# Patient Record
Sex: MALE | Race: WHITE | NOT HISPANIC OR LATINO | Employment: OTHER | URBAN - METROPOLITAN AREA
[De-identification: names, ages, dates, MRNs, and addresses within clinical notes are randomized per-mention and may not be internally consistent; named-entity substitution may affect disease eponyms.]

---

## 2017-01-25 ENCOUNTER — RECORDS - HEALTHEAST (OUTPATIENT)
Dept: LAB | Facility: CLINIC | Age: 77
End: 2017-01-25

## 2017-01-25 LAB
CHOLEST SERPL-MCNC: 157 MG/DL
FASTING STATUS PATIENT QL REPORTED: ABNORMAL
HDLC SERPL-MCNC: 50 MG/DL
LDLC SERPL CALC-MCNC: 77 MG/DL
TRIGL SERPL-MCNC: 152 MG/DL

## 2017-08-07 ENCOUNTER — RECORDS - HEALTHEAST (OUTPATIENT)
Dept: LAB | Facility: CLINIC | Age: 77
End: 2017-08-07

## 2017-08-07 LAB
CHOLEST SERPL-MCNC: 150 MG/DL
FASTING STATUS PATIENT QL REPORTED: YES
HDLC SERPL-MCNC: 51 MG/DL
LDLC SERPL CALC-MCNC: 74 MG/DL
TRIGL SERPL-MCNC: 124 MG/DL

## 2018-02-20 ENCOUNTER — RECORDS - HEALTHEAST (OUTPATIENT)
Dept: LAB | Facility: CLINIC | Age: 78
End: 2018-02-20

## 2018-02-20 LAB
ALBUMIN SERPL-MCNC: 3.5 G/DL (ref 3.5–5)
ALP SERPL-CCNC: 76 U/L (ref 45–120)
ALT SERPL W P-5'-P-CCNC: <9 U/L (ref 0–45)
ANION GAP SERPL CALCULATED.3IONS-SCNC: 14 MMOL/L (ref 5–18)
AST SERPL W P-5'-P-CCNC: 17 U/L (ref 0–40)
BILIRUB SERPL-MCNC: 0.5 MG/DL (ref 0–1)
BUN SERPL-MCNC: 29 MG/DL (ref 8–28)
CALCIUM SERPL-MCNC: 9.3 MG/DL (ref 8.5–10.5)
CHLORIDE BLD-SCNC: 106 MMOL/L (ref 98–107)
CHOLEST SERPL-MCNC: 157 MG/DL
CO2 SERPL-SCNC: 19 MMOL/L (ref 22–31)
CREAT SERPL-MCNC: 1.1 MG/DL (ref 0.7–1.3)
FASTING STATUS PATIENT QL REPORTED: NORMAL
GFR SERPL CREATININE-BSD FRML MDRD: >60 ML/MIN/1.73M2
GLUCOSE BLD-MCNC: 108 MG/DL (ref 70–125)
HDLC SERPL-MCNC: 50 MG/DL
LDLC SERPL CALC-MCNC: 86 MG/DL
POTASSIUM BLD-SCNC: 4.9 MMOL/L (ref 3.5–5)
PROT SERPL-MCNC: 6.9 G/DL (ref 6–8)
SODIUM SERPL-SCNC: 139 MMOL/L (ref 136–145)
TRIGL SERPL-MCNC: 105 MG/DL

## 2019-01-29 ENCOUNTER — RECORDS - HEALTHEAST (OUTPATIENT)
Dept: LAB | Facility: CLINIC | Age: 79
End: 2019-01-29

## 2019-01-29 ENCOUNTER — RECORDS - HEALTHEAST (OUTPATIENT)
Dept: ADMINISTRATIVE | Facility: OTHER | Age: 79
End: 2019-01-29

## 2019-01-29 LAB
ALBUMIN SERPL-MCNC: 3.5 G/DL (ref 3.5–5)
ALP SERPL-CCNC: 87 U/L (ref 45–120)
ALT SERPL W P-5'-P-CCNC: <9 U/L (ref 0–45)
ANION GAP SERPL CALCULATED.3IONS-SCNC: 11 MMOL/L (ref 5–18)
AST SERPL W P-5'-P-CCNC: 13 U/L (ref 0–40)
BILIRUB SERPL-MCNC: 0.5 MG/DL (ref 0–1)
BUN SERPL-MCNC: 35 MG/DL (ref 8–28)
CALCIUM SERPL-MCNC: 9.3 MG/DL (ref 8.5–10.5)
CHLORIDE BLD-SCNC: 103 MMOL/L (ref 98–107)
CHOLEST SERPL-MCNC: 119 MG/DL
CO2 SERPL-SCNC: 24 MMOL/L (ref 22–31)
CREAT SERPL-MCNC: 1.38 MG/DL (ref 0.7–1.3)
FASTING STATUS PATIENT QL REPORTED: YES
GFR SERPL CREATININE-BSD FRML MDRD: 50 ML/MIN/1.73M2
GLUCOSE BLD-MCNC: 116 MG/DL (ref 70–125)
HDLC SERPL-MCNC: 52 MG/DL
LDLC SERPL CALC-MCNC: 48 MG/DL
POTASSIUM BLD-SCNC: 4.8 MMOL/L (ref 3.5–5)
PROT SERPL-MCNC: 6.4 G/DL (ref 6–8)
SODIUM SERPL-SCNC: 138 MMOL/L (ref 136–145)
TRIGL SERPL-MCNC: 96 MG/DL

## 2020-01-28 ENCOUNTER — RECORDS - HEALTHEAST (OUTPATIENT)
Dept: LAB | Facility: CLINIC | Age: 80
End: 2020-01-28

## 2020-01-28 LAB
ALBUMIN SERPL-MCNC: 3 G/DL (ref 3.5–5)
ALP SERPL-CCNC: 78 U/L (ref 45–120)
ALT SERPL W P-5'-P-CCNC: <9 U/L (ref 0–45)
ANION GAP SERPL CALCULATED.3IONS-SCNC: 16 MMOL/L (ref 5–18)
AST SERPL W P-5'-P-CCNC: 21 U/L (ref 0–40)
BILIRUB SERPL-MCNC: 0.3 MG/DL (ref 0–1)
BUN SERPL-MCNC: 31 MG/DL (ref 8–28)
CALCIUM SERPL-MCNC: 9.2 MG/DL (ref 8.5–10.5)
CHLORIDE BLD-SCNC: 106 MMOL/L (ref 98–107)
CHOLEST SERPL-MCNC: 117 MG/DL
CO2 SERPL-SCNC: 18 MMOL/L (ref 22–31)
CREAT SERPL-MCNC: 1.26 MG/DL (ref 0.7–1.3)
FASTING STATUS PATIENT QL REPORTED: YES
FERRITIN SERPL-MCNC: 3 NG/ML (ref 27–300)
GFR SERPL CREATININE-BSD FRML MDRD: 55 ML/MIN/1.73M2
GLUCOSE BLD-MCNC: 108 MG/DL (ref 70–125)
HDLC SERPL-MCNC: 43 MG/DL
IRON SATN MFR SERPL: 5 % (ref 20–50)
IRON SERPL-MCNC: 18 UG/DL (ref 42–175)
LDLC SERPL CALC-MCNC: 53 MG/DL
POTASSIUM BLD-SCNC: 5.4 MMOL/L (ref 3.5–5)
PROT SERPL-MCNC: 6.3 G/DL (ref 6–8)
SODIUM SERPL-SCNC: 140 MMOL/L (ref 136–145)
TIBC SERPL-MCNC: 340 UG/DL (ref 313–563)
TRANSFERRIN SERPL-MCNC: 272 MG/DL (ref 212–360)
TRIGL SERPL-MCNC: 107 MG/DL
TSH SERPL DL<=0.005 MIU/L-ACNC: 2.46 UIU/ML (ref 0.3–5)

## 2020-01-29 ENCOUNTER — ANESTHESIA - HEALTHEAST (OUTPATIENT)
Dept: SURGERY | Facility: HOSPITAL | Age: 80
End: 2020-01-29

## 2020-01-29 ENCOUNTER — RECORDS - HEALTHEAST (OUTPATIENT)
Dept: ADMINISTRATIVE | Facility: OTHER | Age: 80
End: 2020-01-29

## 2020-01-29 ASSESSMENT — MIFFLIN-ST. JEOR: SCORE: 1653.38

## 2020-01-30 ENCOUNTER — SURGERY - HEALTHEAST (OUTPATIENT)
Dept: SURGERY | Facility: HOSPITAL | Age: 80
End: 2020-01-30

## 2020-01-31 ASSESSMENT — MIFFLIN-ST. JEOR: SCORE: 1681.5

## 2020-02-01 ASSESSMENT — MIFFLIN-ST. JEOR: SCORE: 1681.5

## 2020-02-02 ASSESSMENT — MIFFLIN-ST. JEOR: SCORE: 1686.03

## 2020-02-03 ASSESSMENT — MIFFLIN-ST. JEOR: SCORE: 1688.76

## 2020-02-04 ASSESSMENT — MIFFLIN-ST. JEOR: SCORE: 1704.18

## 2020-02-05 ASSESSMENT — MIFFLIN-ST. JEOR: SCORE: 1698.28

## 2020-02-06 ASSESSMENT — MIFFLIN-ST. JEOR
SCORE: 1723.23
SCORE: 1706.45

## 2020-02-07 ASSESSMENT — MIFFLIN-ST. JEOR: SCORE: 1727.77

## 2020-02-08 ASSESSMENT — MIFFLIN-ST. JEOR: SCORE: 1710.53

## 2020-02-09 ENCOUNTER — HOME CARE/HOSPICE - HEALTHEAST (OUTPATIENT)
Dept: HOME HEALTH SERVICES | Facility: HOME HEALTH | Age: 80
End: 2020-02-09

## 2020-02-09 ASSESSMENT — MIFFLIN-ST. JEOR: SCORE: 1706.9

## 2020-02-10 ASSESSMENT — MIFFLIN-ST. JEOR
SCORE: 1666.53
SCORE: 1674.24

## 2020-02-11 ASSESSMENT — MIFFLIN-ST. JEOR
SCORE: 1657.46
SCORE: 1658.82

## 2020-02-12 ASSESSMENT — MIFFLIN-ST. JEOR: SCORE: 1704.18

## 2020-02-14 ENCOUNTER — COMMUNICATION - HEALTHEAST (OUTPATIENT)
Dept: ONCOLOGY | Facility: CLINIC | Age: 80
End: 2020-02-14

## 2020-02-19 ENCOUNTER — RECORDS - HEALTHEAST (OUTPATIENT)
Dept: ADMINISTRATIVE | Facility: OTHER | Age: 80
End: 2020-02-19

## 2020-03-03 ENCOUNTER — OFFICE VISIT - HEALTHEAST (OUTPATIENT)
Dept: SURGERY | Facility: CLINIC | Age: 80
End: 2020-03-03

## 2020-03-03 DIAGNOSIS — C18.0 PRIMARY MALIGNANT NEOPLASM OF CECUM (H): ICD-10-CM

## 2020-03-05 ENCOUNTER — RECORDS - HEALTHEAST (OUTPATIENT)
Dept: ADMINISTRATIVE | Facility: OTHER | Age: 80
End: 2020-03-05

## 2020-03-09 ENCOUNTER — OFFICE VISIT - HEALTHEAST (OUTPATIENT)
Dept: ONCOLOGY | Facility: CLINIC | Age: 80
End: 2020-03-09

## 2020-03-09 DIAGNOSIS — D50.0 IRON DEFICIENCY ANEMIA DUE TO CHRONIC BLOOD LOSS: ICD-10-CM

## 2020-03-09 DIAGNOSIS — C18.2 PRIMARY ADENOCARCINOMA OF ASCENDING COLON (H): ICD-10-CM

## 2020-03-09 LAB
ERYTHROCYTE [DISTWIDTH] IN BLOOD BY AUTOMATED COUNT: 22.7 % (ref 11–14.5)
FERRITIN SERPL-MCNC: 66 NG/ML (ref 27–300)
HCT VFR BLD AUTO: 32.5 % (ref 40–54)
HGB BLD-MCNC: 9.5 G/DL (ref 14–18)
MCH RBC QN AUTO: 22.4 PG (ref 27–34)
MCHC RBC AUTO-ENTMCNC: 29.2 G/DL (ref 32–36)
MCV RBC AUTO: 77 FL (ref 80–100)
PLATELET # BLD AUTO: 863 THOU/UL (ref 140–440)
PMV BLD AUTO: 9.2 FL (ref 8.5–12.5)
RBC # BLD AUTO: 4.24 MILL/UL (ref 4.4–6.2)
WBC: 19.6 THOU/UL (ref 4–11)

## 2020-03-09 ASSESSMENT — MIFFLIN-ST. JEOR: SCORE: 1569.46

## 2020-03-10 ENCOUNTER — COMMUNICATION - HEALTHEAST (OUTPATIENT)
Dept: ADMINISTRATIVE | Facility: HOSPITAL | Age: 80
End: 2020-03-10

## 2020-03-10 ENCOUNTER — COMMUNICATION - HEALTHEAST (OUTPATIENT)
Dept: ONCOLOGY | Facility: CLINIC | Age: 80
End: 2020-03-10

## 2020-03-17 ENCOUNTER — RECORDS - HEALTHEAST (OUTPATIENT)
Dept: LAB | Facility: CLINIC | Age: 80
End: 2020-03-17

## 2020-03-17 LAB
IRON SATN MFR SERPL: 9 % (ref 20–50)
IRON SERPL-MCNC: 27 UG/DL (ref 42–175)
IRON SERPL-MCNC: 27 UG/DL (ref 42–175)
TIBC SERPL-MCNC: 288 UG/DL (ref 313–563)
TRANSFERRIN SERPL-MCNC: 230 MG/DL (ref 212–360)

## 2020-03-31 ENCOUNTER — AMBULATORY - HEALTHEAST (OUTPATIENT)
Dept: ONCOLOGY | Facility: HOSPITAL | Age: 80
End: 2020-03-31

## 2020-04-01 ENCOUNTER — COMMUNICATION - HEALTHEAST (OUTPATIENT)
Dept: ADMINISTRATIVE | Facility: HOSPITAL | Age: 80
End: 2020-04-01

## 2020-04-01 ENCOUNTER — COMMUNICATION - HEALTHEAST (OUTPATIENT)
Dept: ONCOLOGY | Facility: HOSPITAL | Age: 80
End: 2020-04-01

## 2020-05-12 ENCOUNTER — COMMUNICATION - HEALTHEAST (OUTPATIENT)
Dept: ADMINISTRATIVE | Facility: HOSPITAL | Age: 80
End: 2020-05-12

## 2020-06-05 ENCOUNTER — COMMUNICATION - HEALTHEAST (OUTPATIENT)
Dept: ONCOLOGY | Facility: HOSPITAL | Age: 80
End: 2020-06-05

## 2020-06-05 ENCOUNTER — COMMUNICATION - HEALTHEAST (OUTPATIENT)
Dept: ADMINISTRATIVE | Facility: HOSPITAL | Age: 80
End: 2020-06-05

## 2020-06-10 ENCOUNTER — COMMUNICATION - HEALTHEAST (OUTPATIENT)
Dept: ONCOLOGY | Facility: HOSPITAL | Age: 80
End: 2020-06-10

## 2020-06-18 ENCOUNTER — COMMUNICATION - HEALTHEAST (OUTPATIENT)
Dept: ONCOLOGY | Facility: HOSPITAL | Age: 80
End: 2020-06-18

## 2020-10-01 ENCOUNTER — HOSPITAL ENCOUNTER (OUTPATIENT)
Dept: CT IMAGING | Facility: CLINIC | Age: 80
Setting detail: RADIATION/ONCOLOGY SERIES
Discharge: STILL ADMITTED OR ADMINISTRATIVE | End: 2020-10-01
Attending: INTERNAL MEDICINE

## 2020-10-01 DIAGNOSIS — C18.2 PRIMARY ADENOCARCINOMA OF ASCENDING COLON (H): ICD-10-CM

## 2020-10-01 DIAGNOSIS — D50.0 IRON DEFICIENCY ANEMIA DUE TO CHRONIC BLOOD LOSS: ICD-10-CM

## 2020-10-01 LAB
CREAT BLD-MCNC: 1.5 MG/DL (ref 0.7–1.3)
GFR SERPL CREATININE-BSD FRML MDRD: 45 ML/MIN/1.73M2

## 2020-10-05 ENCOUNTER — OFFICE VISIT - HEALTHEAST (OUTPATIENT)
Dept: ONCOLOGY | Facility: HOSPITAL | Age: 80
End: 2020-10-05

## 2020-10-05 ENCOUNTER — AMBULATORY - HEALTHEAST (OUTPATIENT)
Dept: ONCOLOGY | Facility: HOSPITAL | Age: 80
End: 2020-10-05

## 2020-10-05 ENCOUNTER — AMBULATORY - HEALTHEAST (OUTPATIENT)
Dept: INFUSION THERAPY | Facility: HOSPITAL | Age: 80
End: 2020-10-05

## 2020-10-05 DIAGNOSIS — D50.0 IRON DEFICIENCY ANEMIA DUE TO CHRONIC BLOOD LOSS: ICD-10-CM

## 2020-10-05 DIAGNOSIS — C18.2 PRIMARY ADENOCARCINOMA OF ASCENDING COLON (H): ICD-10-CM

## 2020-10-05 LAB
ALBUMIN SERPL-MCNC: 4.1 G/DL (ref 3.5–5)
ALP SERPL-CCNC: 97 U/L (ref 45–120)
ALT SERPL W P-5'-P-CCNC: 10 U/L (ref 0–45)
ANION GAP SERPL CALCULATED.3IONS-SCNC: 10 MMOL/L (ref 5–18)
AST SERPL W P-5'-P-CCNC: 21 U/L (ref 0–40)
BASOPHILS # BLD AUTO: 0.1 THOU/UL (ref 0–0.2)
BASOPHILS NFR BLD AUTO: 1 % (ref 0–2)
BILIRUB SERPL-MCNC: 1 MG/DL (ref 0–1)
BUN SERPL-MCNC: 21 MG/DL (ref 8–28)
CALCIUM SERPL-MCNC: 9.2 MG/DL (ref 8.5–10.5)
CEA SERPL-MCNC: 1.6 NG/ML (ref 0–3)
CHLORIDE BLD-SCNC: 107 MMOL/L (ref 98–107)
CO2 SERPL-SCNC: 23 MMOL/L (ref 22–31)
CREAT SERPL-MCNC: 1.14 MG/DL (ref 0.7–1.3)
EOSINOPHIL # BLD AUTO: 0.2 THOU/UL (ref 0–0.4)
EOSINOPHIL NFR BLD AUTO: 3 % (ref 0–6)
ERYTHROCYTE [DISTWIDTH] IN BLOOD BY AUTOMATED COUNT: 16.4 % (ref 11–14.5)
FERRITIN SERPL-MCNC: 35 NG/ML (ref 27–300)
GFR SERPL CREATININE-BSD FRML MDRD: >60 ML/MIN/1.73M2
GLUCOSE BLD-MCNC: 95 MG/DL (ref 70–125)
HCT VFR BLD AUTO: 45.2 % (ref 40–54)
HGB BLD-MCNC: 14.5 G/DL (ref 14–18)
IMM GRANULOCYTES # BLD: 0 THOU/UL
IMM GRANULOCYTES NFR BLD: 0 %
LYMPHOCYTES # BLD AUTO: 1.8 THOU/UL (ref 0.8–4.4)
LYMPHOCYTES NFR BLD AUTO: 21 % (ref 20–40)
MCH RBC QN AUTO: 28.4 PG (ref 27–34)
MCHC RBC AUTO-ENTMCNC: 32.1 G/DL (ref 32–36)
MCV RBC AUTO: 89 FL (ref 80–100)
MONOCYTES # BLD AUTO: 0.7 THOU/UL (ref 0–0.9)
MONOCYTES NFR BLD AUTO: 8 % (ref 2–10)
NEUTROPHILS # BLD AUTO: 5.6 THOU/UL (ref 2–7.7)
NEUTROPHILS NFR BLD AUTO: 67 % (ref 50–70)
PLATELET # BLD AUTO: 281 THOU/UL (ref 140–440)
PMV BLD AUTO: 10.4 FL (ref 8.5–12.5)
POTASSIUM BLD-SCNC: 4.4 MMOL/L (ref 3.5–5)
PROT SERPL-MCNC: 7.6 G/DL (ref 6–8)
RBC # BLD AUTO: 5.1 MILL/UL (ref 4.4–6.2)
SODIUM SERPL-SCNC: 140 MMOL/L (ref 136–145)
WBC: 8.3 THOU/UL (ref 4–11)

## 2021-01-04 ENCOUNTER — HOSPITAL ENCOUNTER (OUTPATIENT)
Dept: CT IMAGING | Facility: CLINIC | Age: 81
Setting detail: RADIATION/ONCOLOGY SERIES
Discharge: STILL ADMITTED OR ADMINISTRATIVE | End: 2021-01-04
Attending: INTERNAL MEDICINE

## 2021-01-04 DIAGNOSIS — C18.2 PRIMARY ADENOCARCINOMA OF ASCENDING COLON (H): ICD-10-CM

## 2021-01-04 LAB
CREAT BLD-MCNC: 1.1 MG/DL (ref 0.7–1.3)
GFR SERPL CREATININE-BSD FRML MDRD: >60 ML/MIN/1.73M2

## 2021-01-05 ENCOUNTER — OFFICE VISIT - HEALTHEAST (OUTPATIENT)
Dept: ONCOLOGY | Facility: HOSPITAL | Age: 81
End: 2021-01-05

## 2021-01-05 DIAGNOSIS — C18.2 PRIMARY ADENOCARCINOMA OF ASCENDING COLON (H): ICD-10-CM

## 2021-01-28 ENCOUNTER — RECORDS - HEALTHEAST (OUTPATIENT)
Dept: LAB | Facility: CLINIC | Age: 81
End: 2021-01-28

## 2021-01-28 LAB
ALBUMIN SERPL-MCNC: 3.9 G/DL (ref 3.5–5)
ALP SERPL-CCNC: 93 U/L (ref 45–120)
ALT SERPL W P-5'-P-CCNC: <9 U/L (ref 0–45)
ANION GAP SERPL CALCULATED.3IONS-SCNC: 10 MMOL/L (ref 5–18)
AST SERPL W P-5'-P-CCNC: 19 U/L (ref 0–40)
BILIRUB SERPL-MCNC: 1 MG/DL (ref 0–1)
BUN SERPL-MCNC: 24 MG/DL (ref 8–28)
CALCIUM SERPL-MCNC: 9.4 MG/DL (ref 8.5–10.5)
CHLORIDE BLD-SCNC: 107 MMOL/L (ref 98–107)
CHOLEST SERPL-MCNC: 132 MG/DL
CO2 SERPL-SCNC: 24 MMOL/L (ref 22–31)
CREAT SERPL-MCNC: 1.09 MG/DL (ref 0.7–1.3)
FASTING STATUS PATIENT QL REPORTED: YES
GFR SERPL CREATININE-BSD FRML MDRD: >60 ML/MIN/1.73M2
GLUCOSE BLD-MCNC: 98 MG/DL (ref 70–125)
HDLC SERPL-MCNC: 51 MG/DL
IRON SATN MFR SERPL: 49 % (ref 20–50)
IRON SERPL-MCNC: 163 UG/DL (ref 42–175)
LDLC SERPL CALC-MCNC: 43 MG/DL
POTASSIUM BLD-SCNC: 4.5 MMOL/L (ref 3.5–5)
PROT SERPL-MCNC: 7 G/DL (ref 6–8)
SODIUM SERPL-SCNC: 141 MMOL/L (ref 136–145)
TIBC SERPL-MCNC: 336 UG/DL (ref 313–563)
TRANSFERRIN SERPL-MCNC: 269 MG/DL (ref 212–360)
TRIGL SERPL-MCNC: 191 MG/DL

## 2021-02-17 ENCOUNTER — AMBULATORY - HEALTHEAST (OUTPATIENT)
Dept: PHARMACY | Facility: HOSPITAL | Age: 81
End: 2021-02-17

## 2021-06-04 VITALS
BODY MASS INDEX: 31.34 KG/M2 | HEIGHT: 67 IN | OXYGEN SATURATION: 95 % | DIASTOLIC BLOOD PRESSURE: 79 MMHG | WEIGHT: 199.7 LBS | SYSTOLIC BLOOD PRESSURE: 161 MMHG | TEMPERATURE: 98.7 F | HEART RATE: 111 BPM

## 2021-06-04 VITALS — HEIGHT: 71 IN | BODY MASS INDEX: 30.15 KG/M2 | WEIGHT: 215.4 LBS

## 2021-06-05 VITALS
HEART RATE: 80 BPM | DIASTOLIC BLOOD PRESSURE: 91 MMHG | OXYGEN SATURATION: 96 % | WEIGHT: 206.6 LBS | SYSTOLIC BLOOD PRESSURE: 157 MMHG | BODY MASS INDEX: 32.36 KG/M2 | TEMPERATURE: 97.7 F

## 2021-06-05 VITALS
OXYGEN SATURATION: 97 % | HEART RATE: 66 BPM | BODY MASS INDEX: 34.75 KG/M2 | SYSTOLIC BLOOD PRESSURE: 168 MMHG | WEIGHT: 221.9 LBS | DIASTOLIC BLOOD PRESSURE: 81 MMHG | TEMPERATURE: 97.4 F

## 2021-06-05 NOTE — ANESTHESIA PREPROCEDURE EVALUATION
Anesthesia Evaluation      Patient summary reviewed   No history of anesthetic complications     Airway   Mallampati: III  Neck ROM: limited   Pulmonary - negative ROS    breath sounds clear to auscultation  (-) asthma, sleep apnea, not a smoker                         Cardiovascular   Exercise tolerance: > or = 4 METS  (+) hypertension well controlled, , hypercholesterolemia,     Rhythm: regular  Rate: normal,         Neuro/Psych - negative ROS   (-) no seizures, no CVA    Comments: Glaucoma    Endo/Other    (+) obesity,   (-) no diabetes, hypothyroidism     GI/Hepatic/Renal    (-) GERD    Comments: 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 S/p 2 u pRBC since admission. Hbg 8.8     Hbg 8.8  Plt 598  INR 1.03  Active type and screen, neg antibodies  Na 139  K 4.3  BUN/Cr 29/1.28      Dental    (+) poor dentition    Comment: Several missing teeth                       Anesthesia Plan  Planned anesthetic: general endotracheal and peripheral nerve block  GETA, GlideScope  2 PIV  Acetaminophen 1 g, Mg 4 g, ketamine 30 mg on induction  Dexamethasone 10 mg, ondansetron 4 mg  Bilateral TAP blocks post induction, r/b/a discussed and patient in agreement to proceed  Full Code   ASA 3   Induction: intravenous   Anesthetic plan and risks discussed with: patient  Anesthesia plan special considerations: video-assisted, antiemetics, IV therapy two IVs,   Post-op plan: routine recovery

## 2021-06-05 NOTE — ANESTHESIA CARE TRANSFER NOTE
Last vitals:   Vitals:    01/30/20 1340   BP: 112/57   Pulse: 81   Resp: 12   Temp: 36.4  C (97.6  F)   SpO2: 100%     Patient's level of consciousness is drowsy  Spontaneous respirations: yes  Maintains airway independently: yes  Dentition unchanged: yes  Oropharynx: oropharynx clear of all foreign objects    QCDR Measures:  ASA# 20 - Surgical Safety Checklist: WHO surgical safety checklist completed prior to induction    PQRS# 430 - Adult PONV Prevention: 4558F - Pt received => 2 anti-emetic agents (different classes) preop & intraop  ASA# 8 - Peds PONV Prevention: NA - Not pediatric patient, not GA or 2 or more risk factors NOT present  PQRS# 424 - Vandana-op Temp Management: 4559F - At least one body temp DOCUMENTED => 35.5C or 95.9F within required timeframe  PQRS# 426 - PACU Transfer Protocol: - Transfer of care checklist used  ASA# 14 - Acute Post-op Pain: ASA14B - Patient did NOT experience pain >= 7 out of 10

## 2021-06-05 NOTE — ANESTHESIA PROCEDURE NOTES
Peripheral Block    Patient location during procedure: OR  Start time: 1/30/2020 11:24 AM  End time: 1/30/2020 11:30 AM  post-op analgesia per surgeon order as noted in medical record  Staffing:  Performing  Anesthesiologist: Tavia Briones MD  Preanesthetic Checklist  Completed: patient identified, risks, benefits, and alternatives discussed, timeout performed, consent obtained, at patient's request, airway assessed, oxygen available, suction available, emergency drugs available and hand hygiene performed  Peripheral Block  Block type: other, TAP  Prep: ChloraPrep  Patient position: supine  Patient monitoring: cardiac monitor, continuous pulse oximetry, heart rate and blood pressure  Laterality: bilateral, same technique used bilaterally  Injection technique: ultrasound guided    Ultrasound used to visualize needle placement in proximity to nerve being blocked: yes   US used to visualize anesthetic spread  Visualized anatomic structures normal  No Pathological Findings  Permanent ultrasound image captured for medical record  Sterile gel and probe cover used for ultrasound.  Needle  Needle type: Stimuplex   Needle gauge: 20G  Needle length: 4 in  no peripheral nerve catheter placed  Assessment  Injection assessment: no difficulty with injection, negative aspiration for heme, no paresthesia on injection and incremental injection

## 2021-06-06 NOTE — PROGRESS NOTES
General Surgery Clinic Note    S: Pt presents for postop check. He is tolerating a diet but still having 3-5 watery bowel movements daily. He denies abdominal pain, fever, chills, BRBPR. Also occasionally feels weak but is drinking lots of fluids and walking and doing small exercises dialy.    O:   Gen- alert, pleasant responds appropriately  Abd- soft, nondistended, nontender to palpation, midline incision well-healed, no erythema or drainage    A/P: 79y M s/p open right hemicolectomy for cecal mass on 1/30/20. Final pathology pT2N0.    -Pt recovering well but still with some weakness and diarrhea, he does not have any signs or symptoms of infectious diarrhea  -Given he is over 1 month out, will prescribe imodium in addition to metamucil  -Encourage fluid intake and exercise  -Pt scheduled for followup with oncology next week  -RTC 2-3 weeks if diarrhea does not improve     Tiffany Gallegos MD  General Surgery  [facility]   [phone]

## 2021-06-06 NOTE — PROGRESS NOTES
[facility] a friend. Patient is here for a follow up hospital visit. Patient is tired and states his hemoglobin level has been low. Patient will see Dr. Potts today.

## 2021-06-06 NOTE — TELEPHONE ENCOUNTER
----- Message from Jama Potts MD sent at 3/9/2020  5:31 PM CDT -----  Could you let him know his CBC results from today please.  He was interested in his hemoglobin.  Thanks.

## 2021-06-06 NOTE — TELEPHONE ENCOUNTER
Called Kristofer  to follow up and schedule new medical oncology consult as ordered by hospitialist upon discharge. Appointment with Dr. Potts was scheduled on Monday, March 9, 2020 check in at 10:30 a.m. at . New patient letter/map with appointment details, health history form to complete, medication form to update and person-to person communication form were all sent to patient. Nurse navigator role was introduced and he is aware that Rhiannon will be his navigator in the future. He has writer's contact information  In the short term for correspondence. Rhiannon will plan to meet him in the future when he is at clinic for medical oncology consult...Miryam Tran RN

## 2021-06-06 NOTE — TELEPHONE ENCOUNTER
Hemoglobin  14.0 - 18.0 g/dL  9.5 Low     Above from 3/09/20.    Left message for patient to return call.   Zeinab Hawley RN

## 2021-06-06 NOTE — CONSULTS
[facility] Hematology and Oncology Consult Note    Patient: Juvencio Allan  MRN: [id number]  Date of Service: 03/09/2020      Reason for Visit:    1.  Colon cancer  2.  Iron deficiency anemia    Assessment/Plan:    1.  Colon cancer: He appears to have a stage I tumor.  33 lymph nodes negative.  Very low risk of recurrence.  We can follow with surveillance imaging for a few years.  We will see him back in late August with repeat imaging and labs.  Reviewed with them that typically we would do a colonoscopy in 1 year.  We will see how he is doing at that time.    2.  Iron deficiency anemia: He was apparently not discharged on any iron.  Check his hemoglobin today.  He has problems with constipation so he is not sure he would like to try oral iron replacement.  Therefore, I am going to treat him with a course of Feraheme.  This way, he does not need to take oral iron.    ECOG Performance        Distress Assessment       Problem List:    1. Iron deficiency anemia due to chronic blood loss  HM2 (CBC W/O DIFF)    Ferritin    HM2 (CBC W/O DIFF)    Ferritin    HM1(CBC and Differential)    Comprehensive Metabolic Panel    CT Chest Abdomen Pelvis Without Oral With IV Contrast    HM1(CBC and Differential)    Comprehensive Metabolic Panel   2. Primary adenocarcinoma of ascending colon (H)  HM1(CBC and Differential)    Comprehensive Metabolic Panel    CT Chest Abdomen Pelvis Without Oral With IV Contrast    HM1(CBC and Differential)    Comprehensive Metabolic Panel     Staging History:    Cancer Staging  Primary adenocarcinoma of ascending colon (H)  Staging form: Colon And Rectum, AJCC 8th Edition  - Clinical stage from 3/9/2020: Stage I (cT2, cN0, cM0) - Signed by Jama Potts MD on 3/9/2020    History:    Kristofer is an 79-year-old gentleman recently found to have an adenocarcinoma involving the sending colon.  He initially had been starting to have less energy and decreased stamina since around Thanksgiving.  He then noticed  some blood in the bowel movements.  He presented to his primary care provider on .  Found to have a hemoglobin of 7.7 and evidence of hypovolemia.  He was sent to the emergency room.  Imaging at that time revealed an intussusception at the ileal colic junction.  He underwent a right hemicolectomy on .  Pathology revealed a well-differentiated adenocarcinoma.  33 lymph nodes negative.  He was discharged on .  Presents today to discuss further follow-up cares.  No acute today.  Feeling okay.  No abdominal pain.  Has some ongoing issues with constipation.    Past History:    No past medical history on file. No family history on file.   No known family history of blood dyscrasias.     [unfilled] Social History     Socioeconomic History     Marital status:      Spouse name: Not on file     Number of children: Not on file     Years of education: Not on file     Highest education level: Not on file   Occupational History     Not on file   Social Needs     Financial resource strain: Not on file     Food insecurity     Worry: Not on file     Inability: Not on file     Transportation needs     Medical: Not on file     Non-medical: Not on file   Tobacco Use     Smoking status: Former Smoker     Years: 20.00     Types: Cigarettes     Last attempt to quit: 1990     Years since quittin.1     Smokeless tobacco: Never Used   Substance and Sexual Activity     Alcohol use: Yes     Alcohol/week: 12.0 standard drinks     Types: 12 Cans of beer per week     Frequency: 4 or more times a week     Drinks per session: 1 or 2     Binge frequency: Less than monthly     Drug use: Never     Sexual activity: Not Currently   Lifestyle     Physical activity     Days per week: Not on file     Minutes per session: Not on file     Stress: Not on file   Relationships     Social connections     Talks on phone: Not on file     Gets together: Not on file     Attends Mandaen service: Not on file     Active  member of club or organization: Not on file     Attends meetings of clubs or organizations: Not on file     Relationship status: Not on file     Intimate partner violence     Fear of current or ex partner: Not on file     Emotionally abused: Not on file     Physically abused: Not on file     Forced sexual activity: Not on file   Other Topics Concern     Not on file   Social History Narrative     Not on file        Allergies:    No Known Allergies    Review of Systems:    General  General (WDL): Exceptions to WDL  Fatigue: Yes - Recent (Less than 3 months)  Fever: None  Generalized Muscle Weakness: Yes - Recent (Less than 3 months)  Weight Loss: No  ENT  ENT (WDL): Exceptions to WDL  Vertigo (Dizziness): None  Changes in vision: None  Glasses or Contacts: Yes - Recent (Less than 3 months)  Hearing loss: None  Hearing Aids: None  Tinnitus: None  Pain/Pressure in ears: None  Sinus Congestion/Drainage: Yes - Recent (Less than 3 months)  Hoarseness: None  Sore Throat: Yes - Recent (Less than 3 months)  Dental Problems: Yes - Recent (Less than 3 months)  Dentures: Yes - Recent (Less than 3 months)  Respiratory  Respiratory (WDL): Exceptions to WDL  Dyspnea: Yes - Recent (Less than 3 months)  hemoptysis: None  Is patient on O2?: None  Cough: None  Non-Cardiac Chest Pain: None  Exacerbating Activity: With activity  Medications: Other (Comment)  Cardiovascular  Cardiovascular (WDL): All cardiovascular elements are within defined limits  Palpitations: None  Edema Limbs: None  Irregular Heart Beat: None  Chest Pain: None  Lightheadedness: None  Endocrine  Heat Intolerance: None  Excessive Thirst: Yes - Recent (Less than 3 months)  Cold Intolerance: None  Excessive Urination: None  Hotflashes: None  Gastrointestinal  Difficulty Swallowing: None  Heartburn: None  Constipation: None  Yellowish skin and/or eyes: None  Blood from rectum: Yes - Recent (Less than 3 months)  Nausea and Vomiting: Yes - Recent (Less than 3  "months)  Abdominal Pain: None  Diarrhea: Yes - Recent (Less than 3 months)  Have had black or tan stools?: None  Hemorrhoids: None  Poor Appetite: None  Musculoskeletal  Musculoskeletal (WDL): Exceptions to WDL  Range of Motion Limitation: Yes - Recent (Less than 3 months)  Joint pain: None  Back Pain: None  Activity Assistance: None  Difficulty to lie flat for more than 30 minutes: None  Pain interfering with walking: None  Muscle pain or stiffness: None  Recent fall: None  Neurological  History of LOC?: None  Headaches: None  Difficulty walking: Yes - Recent (Less than 3 months)  Difficulty with speech: None  Difficulty with memory: None  Vertigo (Dizziness): None  Dominant Hand: Right  Seizures: None  Difficulty with Balance: Yes - Recent (Less than 3 months)  Numbness and/or tingling: None  Psychological/Emotional  Psychological/Emotional (WDL): All psychological/emotional elements are within defined limits  Hematological/Lymphatic  Hematological/Lymphatic (WDL): All hematological/lymphatic elements are within defined limits  Dermatological  Dermatologic (WDL): Exceptions to WDL  Open wounds: None  Rash : None  Hair Loss: None  Healing Incision: Yes - Recent (Less than 3 months)  Changes in moles: None  Significant sunburn: None  Genitourinary/Reproductive  Genitourinary/Reproductive (WDL): Exceptions to WDL  Urinary Frequency: None  Urinary Incontinence: None  Painful urination: None  Urination more than 2 times a night: Yes - Recent (Less than 3 months)  Urinary Urgency: None  Difficulty Initiating Urine Stream: None  Blood in urine: None  Sensation of incomplete emptying of bladder: None  Sexual concerns: None  Reproductive (Females only)     Pain  Currently in Pain: Yes  Pain Score (Initial OR Reassessment): 2  Pain Frequency: Intermittent  Pain Characteristics : Aching    Physical Exam:    Recent Vitals 3/9/2020   Height 5' 7\"   Weight 199 lbs 11 oz   BSA (m2) 2.07 m2   /79   Pulse 111   Temp 98.7 "   Temp src 1   SpO2 95   Some recent data might be hidden     General: patient appears stated age of 79 y.o.. Nontoxic and in no distress.   HEENT: Head: atraumatic, normocephalic. Sclerae anicteric.  Chest:  Normal respiratory effort  Cardiac:  No edema.   Abdomen: abdomen is non-distended  Extremities: normal tone and muscle bulk.  Skin: no lesions or rash. Warm and dry.   CNS: alert and oriented. Grossly non-focal.   Psychiatric: normal mood and affect.     Lab Results:    No results found for this or any previous visit (from the past 168 hour(s)).    Imaging Results:    EXAM: CT ABDOMEN PELVIS WO ORAL W IV CONTRAST  LOCATION: [facility]  DATE/TIME: 1/29/2020 6:19 PM     FINDINGS:   LOWER CHEST: Numerous tiny micronodules present at lung bases with slight interstitial fibrosis, bronchiectasis and cystic changes.     HEPATOBILIARY: Gallstones are present, no inflammatory process evident. Tiny hepatic cysts.  PANCREAS: Normal.  SPLEEN: Normal.  ADRENAL GLANDS: Normal.  KIDNEYS/BLADDER: No significant mass, stones, or hydronephrosis.     BOWEL: There is an intussusception at the ileocolic junction which may represent an ileocolic intussusception versus a cecal intussusception with the terminal ileum secondarily pulled into the intussusception. There is soft tissue fullness within the   lumen of the intussusception, likely a 3.5 cm mass at the point which is near the level of the hepatic flexure; cecal or terminal ileal carcinoma possible. There is no acute inflammatory process and no obstruction. There is also extensive diverticulosis   of left hemicolon, no active inflammation. With a suggestion of soft tissue fullness or mass as the lead point,     LYMPH NODES: There are mildly enlarged lymph nodes along the ileocolic distribution, largest measures 1.7 x 0.9 cm.  VASCULATURE: Heavy atherosclerotic calcifications of aorta and iliac arteries.  PELVIC ORGANS: Normal bladder. No ascites.     MUSCULOSKELETAL:  Degenerative changes throughout spine. No suspicious lesions.     IMPRESSION:   1.  There is intussusception at the level of the ileocolic junction, likely a cecal colonic intussusception. There is likely a 3.5 cm mass as the end point, possible carcinoma of cecum or ileocecal valve. Mildly prominent local lymph nodes are present.   There is no bowel obstruction.  2.  Extensive diverticulosis of left hemicolon, no active inflammation.    Pathology:    Final Diagnosis    A,B) RIGHT COLON, HEMICOLECTOMY:        - INVASIVE, WELL-DIFFERENTIATED ADENOCARCINOMA, MUCIN-PRODUCING        - SURGICAL MARGINS: NEGATIVE FOR MALIGNANCY        - THIRTY-THREE LYMPH NODES; NEGATIVE FOR METASTATIC ADENOCARCINOMA (0/33)        - BENIGN APPENDIX WITH PARTIAL FIBROUS OBLITERATION        - PLEASE SEE SYNOPTIC REPORT BELOW FOR ADDITIONAL DETAILS        Signed by: Jama Potts MD

## 2021-06-07 NOTE — TELEPHONE ENCOUNTER
Call placed to patient regarding iron transfusion versus iron tablets.  Explained to the patient that the iron is absorbed differently and works differently in the blood to increase his iron levels.  Pt states he does not want the IV iron treatment, he feels the tablets are enough and denies symptoms such as shortness of breath, increased fatigue or lightheadedness.  Pt states he will follow up as recommended for labs later this summer.

## 2021-06-08 NOTE — TELEPHONE ENCOUNTER
Patient was seen by DR geiger for consult at [facility]. Patient called on 5/28/20 [facility] to tranfer care to Dr Murray. He like to switch because he was referred to Dr murray by one of his patient.

## 2021-06-09 NOTE — TELEPHONE ENCOUNTER
Juvencio Cooper was seen for consult on 3/9/20 at [facility] with Dr geiger. He called requesting to change his care to [place] and to see Dr Murray instead( he was referred to Dr murray by one of his friend who a previous patient of dr murray).  Patient is due for scan and clinic visit in august. He stated he will call closer to his appointment to schedule. He doesn't want to do anything at this time due to Covid.  Message was send to [facility] scheduling. Dr geiger and Dr Murray and RNS

## 2021-06-12 NOTE — PROGRESS NOTES
[facility] Hematology and Oncology Progress Note    Patient: Juvencio Allan  MRN: [id number]  Date of Service: 10/05/2020        Reason for Visit    Chief Complaint   Patient presents with     HE Cancer     Primary adenocarcinoma of ascending colon       Assessment and Plan    T2N0M0, Stage 1 right colon cancer s/p hemicolectomy, 1/30/2020  Iron deficiency anemia  Pulmonary nodules    CT scans show no recurrence.  Will continue observation.    Hemoglobin has normalized.  Can stop iron.    Will repeat CT and do fu in 3 months to follow puomonary nodules.    PLAN:    As above  Colonoscopy next year    Measurable Disease:  None     Current Therapy: Non focal      Cancer Staging  Primary adenocarcinoma of ascending colon (H)  Staging form: Colon And Rectum, AJCC 8th Edition  - Clinical stage from 3/9/2020: Stage I (cT2, cN0, cM0) - Signed by Jama Potts MD on 3/9/2020      ECOG Performance   ECOG Performance Status: 1    Distress Assessment  Distress Assessment Score: No distress    Pain           Problem List    1. Primary adenocarcinoma of ascending colon (H)  CT Chest With Contrast        CC: Marquise Addison MD    ______________________________________________________________________________    History of Present Illness    Mr. Juvencio Allan returns for f/u.  Doing well. Good appetite and stable weight.  Soft bms but no blood in stool.  Denies any shortness of breath or cough.    March 2020:    Kristofer is an 79-year-old gentleman recently found to have an adenocarcinoma involving the sending colon.  He initially had been starting to have less energy and decreased stamina since around Thanksgiving.  He then noticed some blood in the bowel movements.  He presented to his primary care provider on January 29.  Found to have a hemoglobin of 7.7 and evidence of hypovolemia.  He was sent to the emergency room.  Imaging at that time revealed an intussusception at the ileal colic junction.  He underwent a right  hemicolectomy on January 30.  Pathology revealed a well-differentiated adenocarcinoma.  33 lymph nodes negative.  He was discharged on February 12.  Presents today to discuss further follow-up cares.  No acute today.  Feeling okay.  No abdominal pain.  Has some ongoing issues with constipation.    Pain Status  Currently in Pain: No/denies    Review of Systems    Constitutional  Constitutional (WDL): Exceptions to WDL  Fatigue: Fatigue relieved by rest  Neurosensory  Neurosensory (WDL): Exceptions to WDL  Ataxia: Asymptomatic, clinical or diagnostic observations only, intervention not indicated  Cardiovascular  Cardiovascular (WDL): All cardiovascular elements are within defined limits  Pulmonary  Respiratory (WDL): Within Defined Limits  Gastrointestinal  Gastrointestinal (WDL): Exceptions to WDL  Constipation: Occasional or intermittent symptoms, occasional use of stool softeners, laxatives, dietary modification, or enema(Occassional)  Genitourinary  Genitourinary (WDL): All genitourinary elements are within defined limits  Integumentary  Integumentary (WDL): All integumentary elements are within defined limits  Patient Coping  Patient Coping: Accepting  Distress Assessment  Distress Assessment Score: No distress  Accompanied by  Accompanied by: Alone    Past History  History reviewed. No pertinent past medical history.    History reviewed. No pertinent surgical history.    Physical Exam    Recent Vitals 10/5/2020   Height -   Weight 206 lbs 10 oz   BSA (m2) 2.1 m2   /91   Pulse 80   Temp 97.7   Temp src 1   SpO2 96   Some recent data might be hidden       GENERAL: Alert and oriented to time place and person. Seated comfortably. In no distress.    HEAD: Atraumatic and normocephalic.    EYES: VAMSHI, EOMI.  No pallor.  No icterus.    Oral cavity: no mucosal lesion or tonsillar enlargement.    NECK: supple. JVP normal.  No thyroid enlargement.    LYMPH NODES: No palpable, cervical, axillary or inguinal  lymphadenopathy.    CHEST: clear to auscultation bilaterally.  Resonant to percussion throughout bilaterally.  Symmetrical breath movements bilaterally.    CVS: S1 and S2 are heard. Regular rate and rhythm.  No murmur or gallop or rub heard.  No peripheral edema.    ABDOMEN: Soft. Not tender. Not distended.  No palpable hepatomegaly or splenomegaly.  No other mass palpable.  Bowel sounds heard.    EXTREMITIES: Warm.    SKIN: no rash, or bruising or purpura.  Has a full head of hair.      Lab Results    Recent Results (from the past 168 hour(s))   POCT CREATININE   Result Value Ref Range    iSTAT Creatinine 1.5 (H) 0.7 - 1.3 mg/dL    iSTAT GFR MDRD Af Amer 55 (L) >60 mL/min/1.73m2    iSTAT GFR MDRD Non Af Amer 45 (L) >60 mL/min/1.73m2   HM1 (CBC with Diff)   Result Value Ref Range    WBC 8.3 4.0 - 11.0 thou/uL    RBC 5.10 4.40 - 6.20 mill/uL    Hemoglobin 14.5 14.0 - 18.0 g/dL    Hematocrit 45.2 40.0 - 54.0 %    MCV 89 80 - 100 fL    MCH 28.4 27.0 - 34.0 pg    MCHC 32.1 32.0 - 36.0 g/dL    RDW 16.4 (H) 11.0 - 14.5 %    Platelets 281 140 - 440 thou/uL    MPV 10.4 8.5 - 12.5 fL    Neutrophils % 67 50 - 70 %    Lymphocytes % 21 20 - 40 %    Monocytes % 8 2 - 10 %    Eosinophils % 3 0 - 6 %    Basophils % 1 0 - 2 %    Immature Granulocyte % 0 <=0 %    Neutrophils Absolute 5.6 2.0 - 7.7 thou/uL    Lymphocytes Absolute 1.8 0.8 - 4.4 thou/uL    Monocytes Absolute 0.7 0.0 - 0.9 thou/uL    Eosinophils Absolute 0.2 0.0 - 0.4 thou/uL    Basophils Absolute 0.1 0.0 - 0.2 thou/uL    Immature Granulocyte Absolute 0.0 <=0.0 thou/uL       Imaging    Ct Chest Abdomen Pelvis Without Oral With Iv Contrast    Result Date: 10/1/2020  EXAM: CT CHEST ABDOMEN PELVIS WO ORAL W IV CONTRAST LOCATION: [facility] DATE/TIME: 10/1/2020 3:09 PM INDICATION: Colon cancer follow-up COMPARISON: 02/05/2020 CT abdomen-pelvis TECHNIQUE: CT scan of the chest, abdomen, and pelvis was performed following injection of IV contrast.  Multiplanar reformats were obtained. Dose reduction techniques were used. CONTRAST: Iohexol (Omni) 100 mL FINDINGS: LUNGS AND PLEURA: Mild emphysema. Few small tiny 2 to 3 mm solid and groundglass nodules in the peripheral upper lobes, as well as lower lobes. Tiny dense peripheral and basilar foci consistent with dendriform ossification. Mild basilar predominant volume loss with scarring and mild-moderate bronchiectasis. MEDIASTINUM/AXILLAE: No adenopathy. Coronary calcifications. HEPATOBILIARY: Stable small hepatic hyperdensities, suggesting incidental cysts, requiring no routine follow-up. Decompressed gallbladder. PANCREAS: Normal. SPLEEN: Normal. ADRENAL GLANDS: Normal. KIDNEYS/BLADDER: Normal. BOWEL: Post right hemicolectomy without significant thickening. No obstruction. Extensive colonic diverticulosis. LYMPH NODES: No abdominal or pelvic adenopathy. VASCULATURE: Severe abdominal aortic atherosclerosis. PELVIC ORGANS: Mild prostatomegaly. MUSCULOSKELETAL: No focal bony lesion.     1.  Several tiny solid and groundglass pulmonary nodules. The appearance favors infectious / inflammatory foci, to include bronchiolitis. As a precaution, recommend 3 month follow-up chest CT. 2.  Additional mineralized basilar nodules consistent with dendriform ossification. 3.  Otherwise, postsurgical changes right hemicolectomy without evidence of recurrent or metastatic disease.         Signed by: Mariajose Escobar MD

## 2021-06-14 NOTE — PROGRESS NOTES
[facility] Hematology and Oncology Progress Note    Patient: Juvencio Allan  MRN: [id number]  Date of Service: 01/05/2021        Reason for Visit    Chief Complaint   Patient presents with     HE Cancer     Primary adenocarcinoma of ascending colon       Assessment and Plan    T2N0M0, Stage 1 right colon cancer s/p hemicolectomy, 1/30/2020  Iron deficiency anemia  Pulmonary nodules    No recurrence of colon cancer.  Will hold off repeat colonoscopy until the summer and pandemic concerns have subsided    CT reviewed and shows resolution of nodules.  No follow-up needed.    PLAN:  Follow-up in 6 months with CEA  Consider colonoscopy after that       Measurable Disease:  None     Current Therapy: Non focal      Cancer Staging  Primary adenocarcinoma of ascending colon (H)  Staging form: Colon And Rectum, AJCC 8th Edition  - Clinical stage from 3/9/2020: Stage I (cT2, cN0, cM0) - Signed by Jama Potts MD on 3/9/2020      ECOG Performance   ECOG Performance Status: 1    Distress Assessment  Distress Assessment Score: No distress    Pain           Problem List    1. Primary adenocarcinoma of ascending colon (H)  CEA (Carcinoembryonic Antigen)        CC: Marquise Addison MD    ______________________________________________________________________________    History of Present Illness    Mr. Juvencio Allan returns for f/u.  Doing well. Good appetite and stable weight.  Soft bms but no blood in stool.  Denies any shortness of breath or cough. Had repeat CT scan.    March 2020:    Kristofer is an 79-year-old gentleman recently found to have an adenocarcinoma involving the sending colon.  He initially had been starting to have less energy and decreased stamina since around Thanksgiving.  He then noticed some blood in the bowel movements.  He presented to his primary care provider on January 29.  Found to have a hemoglobin of 7.7 and evidence of hypovolemia.  He was sent to the emergency room.  Imaging at that time  revealed an intussusception at the ileal colic junction.  He underwent a right hemicolectomy on January 30.  Pathology revealed a well-differentiated adenocarcinoma.  33 lymph nodes negative.  He was discharged on February 12.  Presents today to discuss further follow-up cares.  No acute today.  Feeling okay.  No abdominal pain.  Has some ongoing issues with constipation.    Pain Status  Currently in Pain: No/denies    Review of Systems    Constitutional  Constitutional (WDL): All constitutional elements are within defined limits  Neurosensory  Neurosensory (WDL): Exceptions to WDL  Ataxia: Asymptomatic, clinical or diagnostic observations only, intervention not indicated(Slow gait)  Cardiovascular  Cardiovascular (WDL): All cardiovascular elements are within defined limits  Pulmonary  Respiratory (WDL): Exceptions to WDL  Dyspnea: Shortness of breath with moderate exertion  Gastrointestinal  Gastrointestinal (WDL): All gastrointestinal elements are within defined limits  Genitourinary  Genitourinary (WDL): All genitourinary elements are within defined limits  Integumentary  Integumentary (WDL): All integumentary elements are within defined limits  Patient Coping  Patient Coping: Accepting  Distress Assessment  Distress Assessment Score: No distress  Accompanied by  Accompanied by: Alone    Past History  History reviewed. No pertinent past medical history.    History reviewed. No pertinent surgical history.    Physical Exam    Recent Vitals 1/5/2021   Height -   Weight 221 lbs 14 oz   BSA (m2) 2.18 m2   /81   Pulse 66   Temp 97.4   Temp src 1   SpO2 97   Some recent data might be hidden       GENERAL: Alert and oriented to time place and person. Seated comfortably. In no distress.    HEAD: Atraumatic and normocephalic.    EYES: VAMSHI, EOMI.  No pallor.  No icterus.    Oral cavity: no mucosal lesion or tonsillar enlargement.    NECK: supple. JVP normal.  No thyroid enlargement.    LYMPH NODES: No palpable,  cervical, axillary or inguinal lymphadenopathy.    CHEST: clear to auscultation bilaterally.  Resonant to percussion throughout bilaterally.  Symmetrical breath movements bilaterally.    CVS: S1 and S2 are heard. Regular rate and rhythm.  No murmur or gallop or rub heard.  No peripheral edema.    ABDOMEN: Soft. Not tender. Not distended.  No palpable hepatomegaly or splenomegaly.  No other mass palpable.  Bowel sounds heard.    EXTREMITIES: Warm.    SKIN: no rash, or bruising or purpura.  Has a full head of hair.      Lab Results    Recent Results (from the past 168 hour(s))   POCT CREATININE   Result Value Ref Range    iSTAT Creatinine 1.1 0.7 - 1.3 mg/dL    iSTAT GFR MDRD Af Amer >60 >60 mL/min/1.73m2    iSTAT GFR MDRD Non Af Amer >60 >60 mL/min/1.73m2       Imaging    Ct Chest With Contrast    Result Date: 1/4/2021  EXAM: CT CHEST W CONTRAST LOCATION: [facility] DATE/TIME: 1/4/2021 1:30 PM INDICATION: fu lung nodules COMPARISON: CT of the chest, abdomen, and pelvis 10/01/2020 TECHNIQUE: CT chest with IV contrast. Multiplanar reformats were obtained. Dose reduction techniques were used. CONTRAST: Iohexol (Omni) 100 mL FINDINGS: LUNGS AND PLEURA: Upper zone predominant centrilobular emphysema. Mild peripheral interstitial thickening in areas with paraseptal emphysema in the upper lobes. There are several subpleural bullae in the left lower lobe along the diaphragmatic pleura. Clustered and branching calcifications in the periphery of the lower lobes have pattern typical for dendriform pulmonary ossification, a benign entity. No airspace opacities or actionable nodules. Trachea and central airways are patent and normal caliber. Mild symmetric cylindrical bronchiectasis is present which is most conspicuous in the lower lobes. No airway wall thickening or inspissated airway material. No pleural space abnormality. MEDIASTINUM: Cardiac chambers are normal in size. No pericardial effusion.  Moderate atheromatous calcifications in the proximal left and mild atheromatous calcification of the proximal right coronary arteries. No enlarged mediastinal or hilar lymph nodes. Esophagus is decompressed. Imaged thyroid gland is normal. UPPER ABDOMEN: No significant finding. MUSCULOSKELETAL: Slight anterior wedging of mid thoracic vertebra. Diffuse thoracic spine degenerative osteophytes. No aggressive or destructive bone lesions. Chest wall soft tissues are symmetric.     1.  Clustered branching high attenuation nodules in the periphery of the lower lobes consistent with dendriform pulmonary ossification. A few peripheral areas of groundglass attenuation/interstitial thickening in the periphery of the upper lobes are unchanged and consistent with parenchymal scarring. There are no actionable pulmonary nodules. 2.  Mixed paraseptal and centrilobular distribution emphysema and unchanged bronchiectasis. No acute airways process.        Signed by: Mariajose Escobar MD

## 2021-06-16 PROBLEM — C18.2 PRIMARY ADENOCARCINOMA OF ASCENDING COLON (H): Status: ACTIVE | Noted: 2020-03-09

## 2021-06-16 PROBLEM — K91.89 ILEUS, POSTOPERATIVE (H): Status: ACTIVE | Noted: 2020-02-04

## 2021-06-16 PROBLEM — D50.0 IRON DEFICIENCY ANEMIA DUE TO CHRONIC BLOOD LOSS: Status: ACTIVE | Noted: 2020-03-09

## 2021-06-16 PROBLEM — D50.9 ANEMIA, IRON DEFICIENCY: Status: ACTIVE | Noted: 2020-02-02

## 2021-06-16 PROBLEM — K56.7 ILEUS, POSTOPERATIVE (H): Status: ACTIVE | Noted: 2020-02-04

## 2021-06-16 PROBLEM — N17.9 AKI (ACUTE KIDNEY INJURY) (H): Status: ACTIVE | Noted: 2020-02-04

## 2021-06-16 PROBLEM — Z98.890 STATUS POST NASAL SURGERY: Status: ACTIVE | Noted: 2020-02-04

## 2021-06-16 PROBLEM — K56.1: Status: ACTIVE | Noted: 2020-01-29

## 2021-06-16 PROBLEM — K56.1 INTUSSUSCEPTION (H): Status: ACTIVE | Noted: 2020-01-29

## 2021-06-16 PROBLEM — N17.9 ACUTE KIDNEY FAILURE, UNSPECIFIED (H): Status: ACTIVE | Noted: 2020-02-08

## 2021-06-20 NOTE — LETTER
Letter by Jama Potts MD at      Author: Jama Potts MD Service: -- Author Type: --    Filed:  Encounter Date: 5/12/2020 Status: (Other)                   Office Hours: Monday - Friday 8:00 - 4:30PM    Juvencio Allan  [address] [id number]           May 12, 2020      Dear Juvencio:    It looks as though you are due to see Dr Potts in July. If you would like to schedule this please call [phone]         Sincerely,        [facility]

## 2021-06-20 NOTE — LETTER
Letter by Miryam Tran RN at      Author: Miryam Tran RN Service: -- Author Type: --    Filed:  Encounter Date: 2/14/2020 Status: (Other)       Dear Kristofer,    Thank you for choosing [facility] for your care.  We are committed to providing you with the highest quality and compassionate healthcare services.  The following information pertains to your first appointment with our clinic.    Date/Time of appointment:  Monday, March 9,2020 check in at 10:30 a.m.      Name of your Physician:  Jama Potts MD    What to bring to your appointment:    Completed Patient History/Initial Nursing Assessment and Medication/Allergy List (these forms were sent to you).    Any paperwork or films from your physician that we have asked you to bring.    Your current insurance card(s).    Parking:    Please refer to the map included to direct you to [facility].    You can park in any visitor/patient parking area you choose. There is no charge for parking at [facility].     Enter the hospital at the front/main entrance.      Please check in with our  representatives who will escort you to the clinic located in [address] of the [facility].    We hope these instructions are helpful to you.  If you have any questions or concerns, please call us at [phone].  It is our pleasure to assist you.    Warm Regards,  Rhiannon Sky RN  Nurse Navigator  [phone]

## 2021-06-20 NOTE — LETTER
Letter by Jama Potts MD at      Author: Jama Potts MD Service: -- Author Type: --    Filed:  Encounter Date: 6/5/2020 Status: (Other)                   Office Hours: Monday - Friday 8:00 - 4:30PM    Juvencio Allan  [address] [place] [address]           June 5, 2020      Dear Juvencio:    It looks as though you are due to see Dr. Potts. If you would like to schedule this please call [phone]       Sincerely,        [facility]

## 2021-08-02 ENCOUNTER — ONCOLOGY VISIT (OUTPATIENT)
Dept: ONCOLOGY | Facility: HOSPITAL | Age: 81
End: 2021-08-02
Attending: INTERNAL MEDICINE
Payer: COMMERCIAL

## 2021-08-02 ENCOUNTER — LAB (OUTPATIENT)
Dept: INFUSION THERAPY | Facility: HOSPITAL | Age: 81
End: 2021-08-02
Attending: INTERNAL MEDICINE
Payer: COMMERCIAL

## 2021-08-02 VITALS
OXYGEN SATURATION: 94 % | TEMPERATURE: 98.4 F | SYSTOLIC BLOOD PRESSURE: 191 MMHG | DIASTOLIC BLOOD PRESSURE: 86 MMHG | WEIGHT: 232.3 LBS | HEART RATE: 57 BPM | BODY MASS INDEX: 36.38 KG/M2

## 2021-08-02 DIAGNOSIS — C18.2 PRIMARY ADENOCARCINOMA OF ASCENDING COLON (H): Primary | ICD-10-CM

## 2021-08-02 DIAGNOSIS — C18.2 PRIMARY ADENOCARCINOMA OF ASCENDING COLON (H): ICD-10-CM

## 2021-08-02 LAB
CEA SERPL-MCNC: 2.1 NG/ML (ref 0–3)
HOLD SPECIMEN: NORMAL

## 2021-08-02 PROCEDURE — 36415 COLL VENOUS BLD VENIPUNCTURE: CPT

## 2021-08-02 PROCEDURE — 82378 CARCINOEMBRYONIC ANTIGEN: CPT

## 2021-08-02 PROCEDURE — 99213 OFFICE O/P EST LOW 20 MIN: CPT | Performed by: INTERNAL MEDICINE

## 2021-08-02 PROCEDURE — G0463 HOSPITAL OUTPT CLINIC VISIT: HCPCS

## 2021-08-02 ASSESSMENT — PAIN SCALES - GENERAL: PAINLEVEL: NO PAIN (0)

## 2021-08-02 NOTE — LETTER
"    8/2/2021         RE: Juvencio Allan  [address] [facility] [address]        Dear Colleague,    Thank you for referring your patient, Juvencio Allan, to the [facility]. Please see a copy of my visit note below.    Oncology Rooming Note    August 2, 2021 10:09 AM   Juvencio Allan is a 80 year old male who presents for:    Chief Complaint   Patient presents with     Oncology Clinic Visit     Primary adenocarcinoma of ascending colon      Initial Vitals: BP (!) 191/86 (BP Location: Left arm, Patient Position: Sitting)   Pulse 57   Temp 98.4  F (36.9  C) (Oral)   Wt 105.4 kg (232 lb 4.8 oz)   SpO2 94%   BMI 36.38 kg/m   Estimated body mass index is 36.38 kg/m  as calculated from the following:    Height as of 3/9/20: 1.702 m (5' 7\").    Weight as of this encounter: 105.4 kg (232 lb 4.8 oz). Body surface area is 2.23 meters squared.  No Pain (0) Comment: Data Unavailable   No LMP for male patient.  Allergies reviewed: Yes  Medications reviewed: Yes    Medications: Medication refills not needed today.  Pharmacy name entered into [employer]: [id number] [place], [place] - [address] AT [facility]    Clinical concerns: when to get next colonoscopy      Dasha Castro, RN              [facility] Hematology and Oncology Progress Note    Patient: Juvencio Allan  MRN: [id number]  Date of Service: 01/05/2021        Reason for Visit    Chief Complaint   Patient presents with     HE Cancer     Primary adenocarcinoma of ascending colon       Assessment and Plan    T2N0M0, Stage 1 right colon cancer s/p hemicolectomy, 1/30/2020  Iron deficiency anemia, resolved  Pulmonary nodules, resolved    Doing well with no concern for recurrence of cancer.  CEA level is pending.  We will see back in 6 months with recheck of the CEA level.  Will refer for colonoscopy now.    Plan: As above    Measurable Disease:  None     Current Therapy: Non focal      Cancer " Staging  Primary adenocarcinoma of ascending colon (H)  Staging form: Colon And Rectum, AJCC 8th Edition  - Clinical stage from 3/9/2020: Stage I (cT2, cN0, cM0) - Signed by Jama Potts MD on 3/9/2020      ECOG Performance   ECOG Performance Status: 1    Distress Assessment  Distress Assessment Score: No distress    Pain           Problem List    1. Primary adenocarcinoma of ascending colon (H)  CEA (Carcinoembryonic Antigen)        CC: Marquise Addison MD    ______________________________________________________________________________    History of Present Illness    Kristofer is here for reevaluation.  Seen last in January.  Did have cataract surgery recently.  Otherwise doing well.  Good appetite and increased weight.  No abdominal pain or change in bowels.  ECOG status is 1-2.      January 2021:    Mr. Juvencio Allan returns for f/u.  Doing well. Good appetite and stable weight.  Soft bms but no blood in stool.  Denies any shortness of breath or cough. Had repeat CT scan.    March 2020:    Kristofer is an 79-year-old gentleman recently found to have an adenocarcinoma involving the sending colon.  He initially had been starting to have less energy and decreased stamina since around Thanksgiving.  He then noticed some blood in the bowel movements.  He presented to his primary care provider on January 29.  Found to have a hemoglobin of 7.7 and evidence of hypovolemia.  He was sent to the emergency room.  Imaging at that time revealed an intussusception at the ileal colic junction.  He underwent a right hemicolectomy on January 30.  Pathology revealed a well-differentiated adenocarcinoma.  33 lymph nodes negative.  He was discharged on February 12.  Presents today to discuss further follow-up cares.  No acute today.  Feeling okay.  No abdominal pain.  Has some ongoing issues with constipation.    Pain Status  Currently in Pain: No/denies    Review of Systems    Constitutional  Constitutional (WDL): All  constitutional elements are within defined limits  Neurosensory  Neurosensory (WDL): Exceptions to WDL  Ataxia: Asymptomatic, clinical or diagnostic observations only, intervention not indicated(Slow gait)  Cardiovascular  Cardiovascular (WDL): All cardiovascular elements are within defined limits  Pulmonary  Respiratory (WDL): Exceptions to WDL  Dyspnea: Shortness of breath with moderate exertion  Gastrointestinal  Gastrointestinal (WDL): All gastrointestinal elements are within defined limits  Genitourinary  Genitourinary (WDL): All genitourinary elements are within defined limits  Integumentary  Integumentary (WDL): All integumentary elements are within defined limits  Patient Coping  Patient Coping: Accepting  Distress Assessment  Distress Assessment Score: No distress  Accompanied by  Accompanied by: Alone    Past History  History reviewed. No pertinent past medical history.    History reviewed. No pertinent surgical history.    Physical Exam    Recent Vitals 1/5/2021   Height -   Weight 221 lbs 14 oz   BSA (m2) 2.18 m2   /81   Pulse 66   Temp 97.4   Temp src 1   SpO2 97   Some recent data might be hidden       GENERAL: Alert and oriented to time place and person. Seated comfortably. In no distress.    HEAD: Atraumatic and normocephalic.    EYES: VAMSHI, EOMI.  No pallor.  No icterus.    Oral cavity: no mucosal lesion or tonsillar enlargement.    NECK: supple. JVP normal.  No thyroid enlargement.    LYMPH NODES: No palpable, cervical, axillary or inguinal lymphadenopathy.    CHEST: clear to auscultation bilaterally.  Resonant to percussion throughout bilaterally.  Symmetrical breath movements bilaterally.    CVS: S1 and S2 are heard. Regular rate and rhythm.  No murmur or gallop or rub heard.  No peripheral edema.    ABDOMEN: Soft. Not tender. Not distended.  No palpable hepatomegaly or splenomegaly.  No other mass palpable.  Bowel sounds heard.    EXTREMITIES: Warm.    SKIN: no rash, or bruising or  purpura.  Has a full head of hair.      Lab Results    Recent Results (from the past 168 hour(s))   POCT CREATININE   Result Value Ref Range    iSTAT Creatinine 1.1 0.7 - 1.3 mg/dL    iSTAT GFR MDRD Af Amer >60 >60 mL/min/1.73m2    iSTAT GFR MDRD Non Af Amer >60 >60 mL/min/1.73m2       Imaging    Ct Chest With Contrast    Result Date: 1/4/2021  EXAM: CT CHEST W CONTRAST LOCATION: [facility] DATE/TIME: 1/4/2021 1:30 PM INDICATION: fu lung nodules COMPARISON: CT of the chest, abdomen, and pelvis 10/01/2020 TECHNIQUE: CT chest with IV contrast. Multiplanar reformats were obtained. Dose reduction techniques were used. CONTRAST: Iohexol (Omni) 100 mL FINDINGS: LUNGS AND PLEURA: Upper zone predominant centrilobular emphysema. Mild peripheral interstitial thickening in areas with paraseptal emphysema in the upper lobes. There are several subpleural bullae in the left lower lobe along the diaphragmatic pleura. Clustered and branching calcifications in the periphery of the lower lobes have pattern typical for dendriform pulmonary ossification, a benign entity. No airspace opacities or actionable nodules. Trachea and central airways are patent and normal caliber. Mild symmetric cylindrical bronchiectasis is present which is most conspicuous in the lower lobes. No airway wall thickening or inspissated airway material. No pleural space abnormality. MEDIASTINUM: Cardiac chambers are normal in size. No pericardial effusion. Moderate atheromatous calcifications in the proximal left and mild atheromatous calcification of the proximal right coronary arteries. No enlarged mediastinal or hilar lymph nodes. Esophagus is decompressed. Imaged thyroid gland is normal. UPPER ABDOMEN: No significant finding. MUSCULOSKELETAL: Slight anterior wedging of mid thoracic vertebra. Diffuse thoracic spine degenerative osteophytes. No aggressive or destructive bone lesions. Chest wall soft tissues are symmetric.     1.   Clustered branching high attenuation nodules in the periphery of the lower lobes consistent with dendriform pulmonary ossification. A few peripheral areas of groundglass attenuation/interstitial thickening in the periphery of the upper lobes are unchanged and consistent with parenchymal scarring. There are no actionable pulmonary nodules. 2.  Mixed paraseptal and centrilobular distribution emphysema and unchanged bronchiectasis. No acute airways process.        Signed by: Mariajose Escobar MD        Again, thank you for allowing me to participate in the care of your patient.        Sincerely,        Mariajose Escobar MD

## 2021-08-02 NOTE — PROGRESS NOTES
"Oncology Rooming Note    August 2, 2021 10:09 AM   Juvencio Allan is a 80 year old male who presents for:    Chief Complaint   Patient presents with     Oncology Clinic Visit     Primary adenocarcinoma of ascending colon      Initial Vitals: BP (!) 191/86 (BP Location: Left arm, Patient Position: Sitting)   Pulse 57   Temp 98.4  F (36.9  C) (Oral)   Wt 105.4 kg (232 lb 4.8 oz)   SpO2 94%   BMI 36.38 kg/m   Estimated body mass index is 36.38 kg/m  as calculated from the following:    Height as of 3/9/20: 1.702 m (5' 7\").    Weight as of this encounter: 105.4 kg (232 lb 4.8 oz). Body surface area is 2.23 meters squared.  No Pain (0) Comment: Data Unavailable   No LMP for male patient.  Allergies reviewed: Yes  Medications reviewed: Yes    Medications: Medication refills not needed today.  Pharmacy name entered into [employer]: [id number] [address] [facility]    Clinical concerns: when to get next colonoscopy      Dasha Castro RN            "

## 2021-08-02 NOTE — PROGRESS NOTES
[facility] Hematology and Oncology Progress Note    Patient: Juvencio Allan  MRN: [id number]  Date of Service: 01/05/2021        Reason for Visit    Chief Complaint   Patient presents with     HE Cancer     Primary adenocarcinoma of ascending colon       Assessment and Plan    T2N0M0, Stage 1 right colon cancer s/p hemicolectomy, 1/30/2020  Iron deficiency anemia, resolved  Pulmonary nodules, resolved    Doing well with no concern for recurrence of cancer.  CEA level is pending.  We will see back in 6 months with recheck of the CEA level.  Will refer for colonoscopy now.    Plan: As above    Measurable Disease:  None     Current Therapy: Non focal      Cancer Staging  Primary adenocarcinoma of ascending colon (H)  Staging form: Colon And Rectum, AJCC 8th Edition  - Clinical stage from 3/9/2020: Stage I (cT2, cN0, cM0) - Signed by Jama Potts MD on 3/9/2020      ECOG Performance   ECOG Performance Status: 1    Distress Assessment  Distress Assessment Score: No distress    Pain           Problem List    1. Primary adenocarcinoma of ascending colon (H)  CEA (Carcinoembryonic Antigen)        CC: Marquise Addison MD    ______________________________________________________________________________    History of Present Illness    Kristofer is here for reevaluation.  Seen last in January.  Did have cataract surgery recently.  Otherwise doing well.  Good appetite and increased weight.  No abdominal pain or change in bowels.  ECOG status is 1-2.      January 2021:    Mr. Juvencio Allan returns for f/u.  Doing well. Good appetite and stable weight.  Soft bms but no blood in stool.  Denies any shortness of breath or cough. Had repeat CT scan.    March 2020:    Kristofer is an 79-year-old gentleman recently found to have an adenocarcinoma involving the sending colon.  He initially had been starting to have less energy and decreased stamina since around Thanksgiving.  He then noticed some blood in the bowel movements.  He  presented to his primary care provider on January 29.  Found to have a hemoglobin of 7.7 and evidence of hypovolemia.  He was sent to the emergency room.  Imaging at that time revealed an intussusception at the ileal colic junction.  He underwent a right hemicolectomy on January 30.  Pathology revealed a well-differentiated adenocarcinoma.  33 lymph nodes negative.  He was discharged on February 12.  Presents today to discuss further follow-up cares.  No acute today.  Feeling okay.  No abdominal pain.  Has some ongoing issues with constipation.    Pain Status  Currently in Pain: No/denies    Review of Systems    Constitutional  Constitutional (WDL): All constitutional elements are within defined limits  Neurosensory  Neurosensory (WDL): Exceptions to WDL  Ataxia: Asymptomatic, clinical or diagnostic observations only, intervention not indicated(Slow gait)  Cardiovascular  Cardiovascular (WDL): All cardiovascular elements are within defined limits  Pulmonary  Respiratory (WDL): Exceptions to WDL  Dyspnea: Shortness of breath with moderate exertion  Gastrointestinal  Gastrointestinal (WDL): All gastrointestinal elements are within defined limits  Genitourinary  Genitourinary (WDL): All genitourinary elements are within defined limits  Integumentary  Integumentary (WDL): All integumentary elements are within defined limits  Patient Coping  Patient Coping: Accepting  Distress Assessment  Distress Assessment Score: No distress  Accompanied by  Accompanied by: Alone    Past History  History reviewed. No pertinent past medical history.    History reviewed. No pertinent surgical history.    Physical Exam    Recent Vitals 1/5/2021   Height -   Weight 221 lbs 14 oz   BSA (m2) 2.18 m2   /81   Pulse 66   Temp 97.4   Temp src 1   SpO2 97   Some recent data might be hidden       GENERAL: Alert and oriented to time place and person. Seated comfortably. In no distress.    HEAD: Atraumatic and normocephalic.    EYES: VAMSHI,  EOMI.  No pallor.  No icterus.    Oral cavity: no mucosal lesion or tonsillar enlargement.    NECK: supple. JVP normal.  No thyroid enlargement.    LYMPH NODES: No palpable, cervical, axillary or inguinal lymphadenopathy.    CHEST: clear to auscultation bilaterally.  Resonant to percussion throughout bilaterally.  Symmetrical breath movements bilaterally.    CVS: S1 and S2 are heard. Regular rate and rhythm.  No murmur or gallop or rub heard.  No peripheral edema.    ABDOMEN: Soft. Not tender. Not distended.  No palpable hepatomegaly or splenomegaly.  No other mass palpable.  Bowel sounds heard.    EXTREMITIES: Warm.    SKIN: no rash, or bruising or purpura.  Has a full head of hair.      Lab Results    Recent Results (from the past 168 hour(s))   POCT CREATININE   Result Value Ref Range    iSTAT Creatinine 1.1 0.7 - 1.3 mg/dL    iSTAT GFR MDRD Af Amer >60 >60 mL/min/1.73m2    iSTAT GFR MDRD Non Af Amer >60 >60 mL/min/1.73m2       Imaging    Ct Chest With Contrast    Result Date: 1/4/2021  EXAM: CT CHEST W CONTRAST LOCATION: [facility] DATE/TIME: 1/4/2021 1:30 PM INDICATION: fu lung nodules COMPARISON: CT of the chest, abdomen, and pelvis 10/01/2020 TECHNIQUE: CT chest with IV contrast. Multiplanar reformats were obtained. Dose reduction techniques were used. CONTRAST: Iohexol (Omni) 100 mL FINDINGS: LUNGS AND PLEURA: Upper zone predominant centrilobular emphysema. Mild peripheral interstitial thickening in areas with paraseptal emphysema in the upper lobes. There are several subpleural bullae in the left lower lobe along the diaphragmatic pleura. Clustered and branching calcifications in the periphery of the lower lobes have pattern typical for dendriform pulmonary ossification, a benign entity. No airspace opacities or actionable nodules. Trachea and central airways are patent and normal caliber. Mild symmetric cylindrical bronchiectasis is present which is most conspicuous in the lower  lobes. No airway wall thickening or inspissated airway material. No pleural space abnormality. MEDIASTINUM: Cardiac chambers are normal in size. No pericardial effusion. Moderate atheromatous calcifications in the proximal left and mild atheromatous calcification of the proximal right coronary arteries. No enlarged mediastinal or hilar lymph nodes. Esophagus is decompressed. Imaged thyroid gland is normal. UPPER ABDOMEN: No significant finding. MUSCULOSKELETAL: Slight anterior wedging of mid thoracic vertebra. Diffuse thoracic spine degenerative osteophytes. No aggressive or destructive bone lesions. Chest wall soft tissues are symmetric.     1.  Clustered branching high attenuation nodules in the periphery of the lower lobes consistent with dendriform pulmonary ossification. A few peripheral areas of groundglass attenuation/interstitial thickening in the periphery of the upper lobes are unchanged and consistent with parenchymal scarring. There are no actionable pulmonary nodules. 2.  Mixed paraseptal and centrilobular distribution emphysema and unchanged bronchiectasis. No acute airways process.        Signed by: Mariajose Escobar MD

## 2021-10-15 DIAGNOSIS — Z11.59 ENCOUNTER FOR SCREENING FOR OTHER VIRAL DISEASES: ICD-10-CM

## 2022-03-21 ENCOUNTER — ONCOLOGY VISIT (OUTPATIENT)
Dept: ONCOLOGY | Facility: HOSPITAL | Age: 82
End: 2022-03-21
Attending: INTERNAL MEDICINE
Payer: COMMERCIAL

## 2022-03-21 ENCOUNTER — LAB (OUTPATIENT)
Dept: INFUSION THERAPY | Facility: HOSPITAL | Age: 82
End: 2022-03-21
Attending: INTERNAL MEDICINE
Payer: COMMERCIAL

## 2022-03-21 VITALS
TEMPERATURE: 98.2 F | HEART RATE: 60 BPM | WEIGHT: 244 LBS | BODY MASS INDEX: 36.98 KG/M2 | SYSTOLIC BLOOD PRESSURE: 189 MMHG | DIASTOLIC BLOOD PRESSURE: 89 MMHG | HEIGHT: 68 IN | RESPIRATION RATE: 16 BRPM | OXYGEN SATURATION: 92 %

## 2022-03-21 DIAGNOSIS — C18.2 PRIMARY ADENOCARCINOMA OF ASCENDING COLON (H): Primary | ICD-10-CM

## 2022-03-21 DIAGNOSIS — C18.2 PRIMARY ADENOCARCINOMA OF ASCENDING COLON (H): ICD-10-CM

## 2022-03-21 LAB — CEA SERPL-MCNC: 2.5 NG/ML (ref 0–3)

## 2022-03-21 PROCEDURE — 36415 COLL VENOUS BLD VENIPUNCTURE: CPT

## 2022-03-21 PROCEDURE — 82378 CARCINOEMBRYONIC ANTIGEN: CPT

## 2022-03-21 PROCEDURE — 99214 OFFICE O/P EST MOD 30 MIN: CPT | Performed by: INTERNAL MEDICINE

## 2022-03-21 PROCEDURE — G0463 HOSPITAL OUTPT CLINIC VISIT: HCPCS

## 2022-03-21 RX ORDER — PREDNISOLONE ACETATE 10 MG/ML
SUSPENSION/ DROPS OPHTHALMIC
COMMUNITY
Start: 2021-05-18 | End: 2023-06-02

## 2022-03-21 RX ORDER — LISINOPRIL 20 MG/1
20 TABLET ORAL DAILY
COMMUNITY
End: 2023-06-02

## 2022-03-21 RX ORDER — LATANOPROST 50 UG/ML
SOLUTION/ DROPS OPHTHALMIC
COMMUNITY
Start: 2022-01-28

## 2022-03-21 ASSESSMENT — PAIN SCALES - GENERAL: PAINLEVEL: NO PAIN (0)

## 2022-03-21 NOTE — PROGRESS NOTES
[facility] Hematology and Oncology Progress Note    Patient: Juvencio Allan  MRN: [id number]  Date of Service: 01/05/2021        Reason for Visit    Chief Complaint   Patient presents with     HE Cancer     Primary adenocarcinoma of ascending colon       Assessment and Plan    T2N0M0, Stage 1 right colon cancer s/p hemicolectomy, 1/30/2020  Iron deficiency anemia, resolved  Pulmonary nodules, resolved    Patient feeling well.  No clinical concern for recurrence.  CEA level pending.  We will continue observation.  We will do lab and follow-up again in 6 months.    Reviewed again the rationale for colonoscopy and he will try to schedule this in the next few months.    Plan: As above      Measurable Disease:  None     Current Therapy: Non focal      Cancer Staging  Primary adenocarcinoma of ascending colon (H)  Staging form: Colon And Rectum, AJCC 8th Edition  - Clinical stage from 3/9/2020: Stage I (cT2, cN0, cM0) - Signed by Jama Potts MD on 3/9/2020      ECOG Performance   ECOG Performance Status: 1    Distress Assessment  Distress Assessment Score: No distress    Pain           Problem List    1. Primary adenocarcinoma of ascending colon (H)  CEA (Carcinoembryonic Antigen)        CC: Marquise Addison MD    ______________________________________________________________________________    History of Present Illness    Kristofer is here for reevaluation.  Seen back last August.  More than 2 years out from surgery.  No abdominal pain.  Some loose bowel movements.  Increased appetite and weight.  ECOG status 1.  Has not had colonoscopy.      January 2021:    Mr. Juvencio Allan returns for f/u.  Doing well. Good appetite and stable weight.  Soft bms but no blood in stool.  Denies any shortness of breath or cough. Had repeat CT scan.    March 2020:    Kristofer is an 79-year-old gentleman recently found to have an adenocarcinoma involving the sending colon.  He initially had been starting to have less energy and  decreased stamina since around Thanksgiving.  He then noticed some blood in the bowel movements.  He presented to his primary care provider on January 29.  Found to have a hemoglobin of 7.7 and evidence of hypovolemia.  He was sent to the emergency room.  Imaging at that time revealed an intussusception at the ileal colic junction.  He underwent a right hemicolectomy on January 30.  Pathology revealed a well-differentiated adenocarcinoma.  33 lymph nodes negative.  He was discharged on February 12.  Presents today to discuss further follow-up cares.  No acute today.  Feeling okay.  No abdominal pain.  Has some ongoing issues with constipation.    Pain Status  Currently in Pain: No/denies    Review of Systems    Constitutional  Constitutional (WDL): All constitutional elements are within defined limits  Neurosensory  Neurosensory (WDL): Exceptions to WDL  Ataxia: Asymptomatic, clinical or diagnostic observations only, intervention not indicated(Slow gait)  Cardiovascular  Cardiovascular (WDL): All cardiovascular elements are within defined limits  Pulmonary  Respiratory (WDL): Exceptions to WDL  Dyspnea: Shortness of breath with moderate exertion  Gastrointestinal  Gastrointestinal (WDL): All gastrointestinal elements are within defined limits  Genitourinary  Genitourinary (WDL): All genitourinary elements are within defined limits  Integumentary  Integumentary (WDL): All integumentary elements are within defined limits  Patient Coping  Patient Coping: Accepting  Distress Assessment  Distress Assessment Score: No distress  Accompanied by  Accompanied by: Alone    Past History  History reviewed. No pertinent past medical history.    History reviewed. No pertinent surgical history.    Physical Exam    Recent Vitals 1/5/2021   Height -   Weight 221 lbs 14 oz   BSA (m2) 2.18 m2   /81   Pulse 66   Temp 97.4   Temp src 1   SpO2 97   Some recent data might be hidden       GENERAL: Alert and oriented to time place and  person. Seated comfortably. In no distress.    HEAD: Atraumatic and normocephalic.    EYES: VAMSHI, EOMI.  No pallor.  No icterus.    Oral cavity: no mucosal lesion or tonsillar enlargement.    NECK: supple. JVP normal.  No thyroid enlargement.    LYMPH NODES: No palpable, cervical, axillary or inguinal lymphadenopathy.    CHEST: clear to auscultation bilaterally.  Resonant to percussion throughout bilaterally.  Symmetrical breath movements bilaterally.    CVS: S1 and S2 are heard. Regular rate and rhythm.  No murmur or gallop or rub heard.  No peripheral edema.    ABDOMEN: Soft. Not tender. Not distended.  No palpable hepatomegaly or splenomegaly.  No other mass palpable.  Bowel sounds heard.    EXTREMITIES: Warm.    SKIN: no rash, or bruising or purpura.  Has a full head of hair.      Lab Results    Recent Results (from the past 168 hour(s))   POCT CREATININE   Result Value Ref Range    iSTAT Creatinine 1.1 0.7 - 1.3 mg/dL    iSTAT GFR MDRD Af Amer >60 >60 mL/min/1.73m2    iSTAT GFR MDRD Non Af Amer >60 >60 mL/min/1.73m2       Imaging    Ct Chest With Contrast    Result Date: 1/4/2021  EXAM: CT CHEST W CONTRAST LOCATION: [facility] DATE/TIME: 1/4/2021 1:30 PM INDICATION: fu lung nodules COMPARISON: CT of the chest, abdomen, and pelvis 10/01/2020 TECHNIQUE: CT chest with IV contrast. Multiplanar reformats were obtained. Dose reduction techniques were used. CONTRAST: Iohexol (Omni) 100 mL FINDINGS: LUNGS AND PLEURA: Upper zone predominant centrilobular emphysema. Mild peripheral interstitial thickening in areas with paraseptal emphysema in the upper lobes. There are several subpleural bullae in the left lower lobe along the diaphragmatic pleura. Clustered and branching calcifications in the periphery of the lower lobes have pattern typical for dendriform pulmonary ossification, a benign entity. No airspace opacities or actionable nodules. Trachea and central airways are patent and normal  caliber. Mild symmetric cylindrical bronchiectasis is present which is most conspicuous in the lower lobes. No airway wall thickening or inspissated airway material. No pleural space abnormality. MEDIASTINUM: Cardiac chambers are normal in size. No pericardial effusion. Moderate atheromatous calcifications in the proximal left and mild atheromatous calcification of the proximal right coronary arteries. No enlarged mediastinal or hilar lymph nodes. Esophagus is decompressed. Imaged thyroid gland is normal. UPPER ABDOMEN: No significant finding. MUSCULOSKELETAL: Slight anterior wedging of mid thoracic vertebra. Diffuse thoracic spine degenerative osteophytes. No aggressive or destructive bone lesions. Chest wall soft tissues are symmetric.     1.  Clustered branching high attenuation nodules in the periphery of the lower lobes consistent with dendriform pulmonary ossification. A few peripheral areas of groundglass attenuation/interstitial thickening in the periphery of the upper lobes are unchanged and consistent with parenchymal scarring. There are no actionable pulmonary nodules. 2.  Mixed paraseptal and centrilobular distribution emphysema and unchanged bronchiectasis. No acute airways process.        Signed by: Mariajose Escobar MD

## 2022-03-21 NOTE — PROGRESS NOTES
"Oncology Rooming Note    March 21, 2022 1:26 PM   Juvencio Allan is a 81 year old male who presents for:    Chief Complaint   Patient presents with     Oncology Clinic Visit     Initial Vitals: BP (!) 189/89   Pulse 60   Temp 98.2  F (36.8  C)   Resp 16   Ht 1.715 m (5' 7.5\")   Wt 110.7 kg (244 lb)   SpO2 92%   BMI 37.65 kg/m   Estimated body mass index is 37.65 kg/m  as calculated from the following:    Height as of this encounter: 1.715 m (5' 7.5\").    Weight as of this encounter: 110.7 kg (244 lb). Body surface area is 2.3 meters squared.  No Pain (0) Comment: Data Unavailable   No LMP for male patient.  Allergies reviewed: Yes  Medications reviewed: Yes    Medications: Medication refills not needed today.  Pharmacy name entered into [facility]: [facility] #[id number] [place] [address] [facility] [address] [facility]    Clinical concerns: Lab results        Melva Hill RN            "

## 2022-03-21 NOTE — LETTER
"    3/21/2022         RE: Juvencio Allan  [address] [facility] [address] [facility] [address]        Dear Colleague,    Thank you for referring your patient, Juvencio Allan, to the [facility]. Please see a copy of my visit note below.    Oncology Rooming Note    March 21, 2022 1:26 PM   Juvencio Allan is a 81 year old male who presents for:    Chief Complaint   Patient presents with     Oncology Clinic Visit     Initial Vitals: BP (!) 189/89   Pulse 60   Temp 98.2  F (36.8  C)   Resp 16   Ht 1.715 m (5' 7.5\")   Wt 110.7 kg (244 lb)   SpO2 92%   BMI 37.65 kg/m   Estimated body mass index is 37.65 kg/m  as calculated from the following:    Height as of this encounter: 1.715 m (5' 7.5\").    Weight as of this encounter: 110.7 kg (244 lb). Body surface area is 2.3 meters squared.  No Pain (0) Comment: Data Unavailable   No LMP for male patient.  Allergies reviewed: Yes  Medications reviewed: Yes    Medications: Medication refills not needed today.  Pharmacy name entered into [email or web address]: [facility] #[id number] [place], [place] - [facility] [address] AT [facility]    Clinical concerns: Lab results        Melva Hill RN              [facility] Hematology and Oncology Progress Note    Patient: Juvencio Allan  MRN: [id number]  Date of Service: 01/05/2021        Reason for Visit    Chief Complaint   Patient presents with     HE Cancer     Primary adenocarcinoma of ascending colon       Assessment and Plan    T2N0M0, Stage 1 right colon cancer s/p hemicolectomy, 1/30/2020  Iron deficiency anemia, resolved  Pulmonary nodules, resolved    Patient feeling well.  No clinical concern for recurrence.  CEA level pending.  We will continue observation.  We will do lab and follow-up again in 6 months.    Reviewed again the rationale for colonoscopy and he will try to schedule this in the next few months.    Plan: As above      Measurable Disease:  None     Current Therapy: Non focal      Cancer " Staging  Primary adenocarcinoma of ascending colon (H)  Staging form: Colon And Rectum, AJCC 8th Edition  - Clinical stage from 3/9/2020: Stage I (cT2, cN0, cM0) - Signed by Jama Potts MD on 3/9/2020      ECOG Performance   ECOG Performance Status: 1    Distress Assessment  Distress Assessment Score: No distress    Pain           Problem List    1. Primary adenocarcinoma of ascending colon (H)  CEA (Carcinoembryonic Antigen)        CC: Marquise Addison MD    ______________________________________________________________________________    History of Present Illness    Kristofer is here for reevaluation.  Seen back last August.  More than 2 years out from surgery.  No abdominal pain.  Some loose bowel movements.  Increased appetite and weight.  ECOG status 1.  Has not had colonoscopy.      January 2021:    Mr. Juvencio Allan returns for f/u.  Doing well. Good appetite and stable weight.  Soft bms but no blood in stool.  Denies any shortness of breath or cough. Had repeat CT scan.    March 2020:    Kristofer is an 79-year-old gentleman recently found to have an adenocarcinoma involving the sending colon.  He initially had been starting to have less energy and decreased stamina since around Thanksgiving.  He then noticed some blood in the bowel movements.  He presented to his primary care provider on January 29.  Found to have a hemoglobin of 7.7 and evidence of hypovolemia.  He was sent to the emergency room.  Imaging at that time revealed an intussusception at the ileal colic junction.  He underwent a right hemicolectomy on January 30.  Pathology revealed a well-differentiated adenocarcinoma.  33 lymph nodes negative.  He was discharged on February 12.  Presents today to discuss further follow-up cares.  No acute today.  Feeling okay.  No abdominal pain.  Has some ongoing issues with constipation.    Pain Status  Currently in Pain: No/denies    Review of Systems    Constitutional  Constitutional (WDL): All  constitutional elements are within defined limits  Neurosensory  Neurosensory (WDL): Exceptions to WDL  Ataxia: Asymptomatic, clinical or diagnostic observations only, intervention not indicated(Slow gait)  Cardiovascular  Cardiovascular (WDL): All cardiovascular elements are within defined limits  Pulmonary  Respiratory (WDL): Exceptions to WDL  Dyspnea: Shortness of breath with moderate exertion  Gastrointestinal  Gastrointestinal (WDL): All gastrointestinal elements are within defined limits  Genitourinary  Genitourinary (WDL): All genitourinary elements are within defined limits  Integumentary  Integumentary (WDL): All integumentary elements are within defined limits  Patient Coping  Patient Coping: Accepting  Distress Assessment  Distress Assessment Score: No distress  Accompanied by  Accompanied by: Alone    Past History  History reviewed. No pertinent past medical history.    History reviewed. No pertinent surgical history.    Physical Exam    Recent Vitals 1/5/2021   Height -   Weight 221 lbs 14 oz   BSA (m2) 2.18 m2   /81   Pulse 66   Temp 97.4   Temp src 1   SpO2 97   Some recent data might be hidden       GENERAL: Alert and oriented to time place and person. Seated comfortably. In no distress.    HEAD: Atraumatic and normocephalic.    EYES: VAMSHI, EOMI.  No pallor.  No icterus.    Oral cavity: no mucosal lesion or tonsillar enlargement.    NECK: supple. JVP normal.  No thyroid enlargement.    LYMPH NODES: No palpable, cervical, axillary or inguinal lymphadenopathy.    CHEST: clear to auscultation bilaterally.  Resonant to percussion throughout bilaterally.  Symmetrical breath movements bilaterally.    CVS: S1 and S2 are heard. Regular rate and rhythm.  No murmur or gallop or rub heard.  No peripheral edema.    ABDOMEN: Soft. Not tender. Not distended.  No palpable hepatomegaly or splenomegaly.  No other mass palpable.  Bowel sounds heard.    EXTREMITIES: Warm.    SKIN: no rash, or bruising or  purpura.  Has a full head of hair.      Lab Results    Recent Results (from the past 168 hour(s))   POCT CREATININE   Result Value Ref Range    iSTAT Creatinine 1.1 0.7 - 1.3 mg/dL    iSTAT GFR MDRD Af Amer >60 >60 mL/min/1.73m2    iSTAT GFR MDRD Non Af Amer >60 >60 mL/min/1.73m2       Imaging    Ct Chest With Contrast    Result Date: 1/4/2021  EXAM: CT CHEST W CONTRAST LOCATION: [facility] DATE/TIME: 1/4/2021 1:30 PM INDICATION: fu lung nodules COMPARISON: CT of the chest, abdomen, and pelvis 10/01/2020 TECHNIQUE: CT chest with IV contrast. Multiplanar reformats were obtained. Dose reduction techniques were used. CONTRAST: Iohexol (Omni) 100 mL FINDINGS: LUNGS AND PLEURA: Upper zone predominant centrilobular emphysema. Mild peripheral interstitial thickening in areas with paraseptal emphysema in the upper lobes. There are several subpleural bullae in the left lower lobe along the diaphragmatic pleura. Clustered and branching calcifications in the periphery of the lower lobes have pattern typical for dendriform pulmonary ossification, a benign entity. No airspace opacities or actionable nodules. Trachea and central airways are patent and normal caliber. Mild symmetric cylindrical bronchiectasis is present which is most conspicuous in the lower lobes. No airway wall thickening or inspissated airway material. No pleural space abnormality. MEDIASTINUM: Cardiac chambers are normal in size. No pericardial effusion. Moderate atheromatous calcifications in the proximal left and mild atheromatous calcification of the proximal right coronary arteries. No enlarged mediastinal or hilar lymph nodes. Esophagus is decompressed. Imaged thyroid gland is normal. UPPER ABDOMEN: No significant finding. MUSCULOSKELETAL: Slight anterior wedging of mid thoracic vertebra. Diffuse thoracic spine degenerative osteophytes. No aggressive or destructive bone lesions. Chest wall soft tissues are symmetric.     1.   Clustered branching high attenuation nodules in the periphery of the lower lobes consistent with dendriform pulmonary ossification. A few peripheral areas of groundglass attenuation/interstitial thickening in the periphery of the upper lobes are unchanged and consistent with parenchymal scarring. There are no actionable pulmonary nodules. 2.  Mixed paraseptal and centrilobular distribution emphysema and unchanged bronchiectasis. No acute airways process.        Signed by: Mariajose Escobar MD        Again, thank you for allowing me to participate in the care of your patient.        Sincerely,        Mariajose Escobar MD

## 2022-05-19 ENCOUNTER — LAB REQUISITION (OUTPATIENT)
Dept: LAB | Facility: CLINIC | Age: 82
End: 2022-05-19
Payer: COMMERCIAL

## 2022-05-19 DIAGNOSIS — E78.5 HYPERLIPIDEMIA, UNSPECIFIED: ICD-10-CM

## 2022-05-19 DIAGNOSIS — I10 ESSENTIAL (PRIMARY) HYPERTENSION: ICD-10-CM

## 2022-05-19 LAB
ALBUMIN SERPL-MCNC: 4 G/DL (ref 3.5–5)
ALP SERPL-CCNC: 107 U/L (ref 45–120)
ALT SERPL W P-5'-P-CCNC: <9 U/L (ref 0–45)
ANION GAP SERPL CALCULATED.3IONS-SCNC: 11 MMOL/L (ref 5–18)
AST SERPL W P-5'-P-CCNC: 18 U/L (ref 0–40)
BILIRUB SERPL-MCNC: 1.1 MG/DL (ref 0–1)
BUN SERPL-MCNC: 23 MG/DL (ref 8–28)
CALCIUM SERPL-MCNC: 9.6 MG/DL (ref 8.5–10.5)
CHLORIDE BLD-SCNC: 108 MMOL/L (ref 98–107)
CHOLEST SERPL-MCNC: 132 MG/DL
CO2 SERPL-SCNC: 22 MMOL/L (ref 22–31)
CREAT SERPL-MCNC: 1.16 MG/DL (ref 0.7–1.3)
GFR SERPL CREATININE-BSD FRML MDRD: 63 ML/MIN/1.73M2
GLUCOSE BLD-MCNC: 116 MG/DL (ref 70–125)
HDLC SERPL-MCNC: 48 MG/DL
LDLC SERPL CALC-MCNC: 54 MG/DL
POTASSIUM BLD-SCNC: 4.6 MMOL/L (ref 3.5–5)
PROT SERPL-MCNC: 7 G/DL (ref 6–8)
SODIUM SERPL-SCNC: 141 MMOL/L (ref 136–145)
TRIGL SERPL-MCNC: 148 MG/DL

## 2022-05-19 PROCEDURE — 80053 COMPREHEN METABOLIC PANEL: CPT | Mod: ORL | Performed by: STUDENT IN AN ORGANIZED HEALTH CARE EDUCATION/TRAINING PROGRAM

## 2022-05-19 PROCEDURE — 80061 LIPID PANEL: CPT | Mod: ORL | Performed by: STUDENT IN AN ORGANIZED HEALTH CARE EDUCATION/TRAINING PROGRAM

## 2022-06-16 ENCOUNTER — LAB REQUISITION (OUTPATIENT)
Dept: LAB | Facility: CLINIC | Age: 82
End: 2022-06-16
Payer: COMMERCIAL

## 2022-06-16 DIAGNOSIS — I10 ESSENTIAL (PRIMARY) HYPERTENSION: ICD-10-CM

## 2022-06-16 LAB
ANION GAP SERPL CALCULATED.3IONS-SCNC: 11 MMOL/L (ref 5–18)
BUN SERPL-MCNC: 24 MG/DL (ref 8–28)
CALCIUM SERPL-MCNC: 9.7 MG/DL (ref 8.5–10.5)
CHLORIDE BLD-SCNC: 106 MMOL/L (ref 98–107)
CO2 SERPL-SCNC: 23 MMOL/L (ref 22–31)
CREAT SERPL-MCNC: 1.06 MG/DL (ref 0.7–1.3)
GFR SERPL CREATININE-BSD FRML MDRD: 70 ML/MIN/1.73M2
GLUCOSE BLD-MCNC: 98 MG/DL (ref 70–125)
POTASSIUM BLD-SCNC: 4.8 MMOL/L (ref 3.5–5)
SODIUM SERPL-SCNC: 140 MMOL/L (ref 136–145)

## 2022-06-16 PROCEDURE — 80048 BASIC METABOLIC PNL TOTAL CA: CPT | Mod: ORL | Performed by: STUDENT IN AN ORGANIZED HEALTH CARE EDUCATION/TRAINING PROGRAM

## 2022-09-26 ENCOUNTER — ONCOLOGY VISIT (OUTPATIENT)
Dept: ONCOLOGY | Facility: HOSPITAL | Age: 82
End: 2022-09-26
Attending: INTERNAL MEDICINE
Payer: COMMERCIAL

## 2022-09-26 ENCOUNTER — LAB (OUTPATIENT)
Dept: INFUSION THERAPY | Facility: HOSPITAL | Age: 82
End: 2022-09-26
Attending: INTERNAL MEDICINE
Payer: COMMERCIAL

## 2022-09-26 VITALS
SYSTOLIC BLOOD PRESSURE: 126 MMHG | HEIGHT: 68 IN | WEIGHT: 236.4 LBS | TEMPERATURE: 98.2 F | OXYGEN SATURATION: 93 % | HEART RATE: 76 BPM | RESPIRATION RATE: 18 BRPM | DIASTOLIC BLOOD PRESSURE: 66 MMHG | BODY MASS INDEX: 35.83 KG/M2

## 2022-09-26 DIAGNOSIS — C18.2 PRIMARY ADENOCARCINOMA OF ASCENDING COLON (H): ICD-10-CM

## 2022-09-26 DIAGNOSIS — C18.2 PRIMARY ADENOCARCINOMA OF ASCENDING COLON (H): Primary | ICD-10-CM

## 2022-09-26 LAB — CEA SERPL-MCNC: 1.9 NG/ML

## 2022-09-26 PROCEDURE — 36415 COLL VENOUS BLD VENIPUNCTURE: CPT

## 2022-09-26 PROCEDURE — 82378 CARCINOEMBRYONIC ANTIGEN: CPT

## 2022-09-26 PROCEDURE — 99214 OFFICE O/P EST MOD 30 MIN: CPT | Performed by: INTERNAL MEDICINE

## 2022-09-26 PROCEDURE — G0463 HOSPITAL OUTPT CLINIC VISIT: HCPCS

## 2022-09-26 RX ORDER — LISINOPRIL 10 MG/1
10 TABLET ORAL DAILY
COMMUNITY
Start: 2022-07-30

## 2022-09-26 ASSESSMENT — PAIN SCALES - GENERAL: PAINLEVEL: NO PAIN (0)

## 2022-09-26 NOTE — PROGRESS NOTES
"Oncology Rooming Note    September 26, 2022 1:09 PM   Juvencio Allan is a 81 year old male who presents for:    Chief Complaint   Patient presents with     Oncology Clinic Visit     6 moPrimary adenocarcinoma of ascending colonnth return Primary adenocarcinoma of ascending colon, review lab     Initial Vitals: /66 (BP Location: Right arm, Patient Position: Sitting, Cuff Size: Adult Large)   Pulse 76   Temp 98.2  F (36.8  C) (Oral)   Resp 18   Ht 1.715 m (5' 7.52\")   Wt 107.2 kg (236 lb 6.4 oz)   SpO2 93%   BMI 36.46 kg/m   Estimated body mass index is 36.46 kg/m  as calculated from the following:    Height as of this encounter: 1.715 m (5' 7.52\").    Weight as of this encounter: 107.2 kg (236 lb 6.4 oz). Body surface area is 2.26 meters squared.  No Pain (0) Comment: Data Unavailable   No LMP for male patient.  Allergies reviewed: Yes  Medications reviewed: Yes    Medications: Medication refills not needed today.  Pharmacy name entered into [employer]: [id number] - [place], [place] - [address] AT [facility] [address]    Clinical concerns: Primary adenocarcinoma of ascending colon        Hillary Marc CMA              "

## 2022-09-26 NOTE — LETTER
"    9/26/2022         RE: Juvencio Allan  [address] [facility] [address]        Dear Colleague,    Thank you for referring your patient, Juvencio Allan, to the [facility]. Please see a copy of my visit note below.    Oncology Rooming Note    September 26, 2022 1:09 PM   Juvencio Allan is a 81 year old male who presents for:    Chief Complaint   Patient presents with     Oncology Clinic Visit     6 moPrimary adenocarcinoma of ascending colonnth return Primary adenocarcinoma of ascending colon, review lab     Initial Vitals: /66 (BP Location: Right arm, Patient Position: Sitting, Cuff Size: Adult Large)   Pulse 76   Temp 98.2  F (36.8  C) (Oral)   Resp 18   Ht 1.715 m (5' 7.52\")   Wt 107.2 kg (236 lb 6.4 oz)   SpO2 93%   BMI 36.46 kg/m   Estimated body mass index is 36.46 kg/m  as calculated from the following:    Height as of this encounter: 1.715 m (5' 7.52\").    Weight as of this encounter: 107.2 kg (236 lb 6.4 oz). Body surface area is 2.26 meters squared.  No Pain (0) Comment: Data Unavailable   No LMP for male patient.  Allergies reviewed: Yes  Medications reviewed: Yes    Medications: Medication refills not needed today.  Pharmacy name entered into [employer]: [facility] #[id number] [place], [place] - [address] AT [facility]    Clinical concerns: Primary adenocarcinoma of ascending colon        Hillary Marc, [facility] Hematology and Oncology Progress Note    Patient: Juvencio Allan  MRN: [id number]  Date of Service: 01/05/2021        Reason for Visit    Chief Complaint   Patient presents with     HE Cancer     Primary adenocarcinoma of ascending colon       Assessment and Plan    T2N0M0, Stage 1 right colon cancer s/p hemicolectomy, 1/30/2020  Iron deficiency anemia, resolved  Pulmonary nodules, resolved    No clinical concern for recurrence of colon cancer.  CEA level pending.  Not interested in colonoscopy.  We " will continue observation.  We will see back in 6 months with recheck of labs.    Plan: As above      Measurable Disease:  None     Current Therapy: Non focal      Cancer Staging  Primary adenocarcinoma of ascending colon (H)  Staging form: Colon And Rectum, AJCC 8th Edition  - Clinical stage from 3/9/2020: Stage I (cT2, cN0, cM0) - Signed by Jama Potts MD on 3/9/2020      ECOG Performance   ECOG Performance Status: 1    Distress Assessment  Distress Assessment Score: No distress    Pain           Problem List    1. Primary adenocarcinoma of ascending colon (H)  CEA (Carcinoembryonic Antigen)        CC: Marquise Addison MD    ______________________________________________________________________________    History of Present Illness    Kristofer is here for reevaluation.  Seen 6 months ago.  Almost 3 years out from his surgery.  Doing well.  No abdominal pain.  No change with bowels.  No blood in the stool.  Appetite and weight stable.  Not interested in pursuing further colonoscopy.      January 2021:    Mr. Juvencio Allan returns for f/u.  Doing well. Good appetite and stable weight.  Soft bms but no blood in stool.  Denies any shortness of breath or cough. Had repeat CT scan.    March 2020:    Kristofer is an 79-year-old gentleman recently found to have an adenocarcinoma involving the sending colon.  He initially had been starting to have less energy and decreased stamina since around Thanksgiving.  He then noticed some blood in the bowel movements.  He presented to his primary care provider on January 29.  Found to have a hemoglobin of 7.7 and evidence of hypovolemia.  He was sent to the emergency room.  Imaging at that time revealed an intussusception at the ileal colic junction.  He underwent a right hemicolectomy on January 30.  Pathology revealed a well-differentiated adenocarcinoma.  33 lymph nodes negative.  He was discharged on February 12.  Presents today to discuss further follow-up cares.  No acute  today.  Feeling okay.  No abdominal pain.  Has some ongoing issues with constipation.    Pain Status  Currently in Pain: No/denies    Review of Systems    Constitutional  Constitutional (WDL): All constitutional elements are within defined limits  Neurosensory  Neurosensory (WDL): Exceptions to WDL  Ataxia: Asymptomatic, clinical or diagnostic observations only, intervention not indicated(Slow gait)  Cardiovascular  Cardiovascular (WDL): All cardiovascular elements are within defined limits  Pulmonary  Respiratory (WDL): Exceptions to WDL  Dyspnea: Shortness of breath with moderate exertion  Gastrointestinal  Gastrointestinal (WDL): All gastrointestinal elements are within defined limits  Genitourinary  Genitourinary (WDL): All genitourinary elements are within defined limits  Integumentary  Integumentary (WDL): All integumentary elements are within defined limits  Patient Coping  Patient Coping: Accepting  Distress Assessment  Distress Assessment Score: No distress  Accompanied by  Accompanied by: Alone    Past History  History reviewed. No pertinent past medical history.    History reviewed. No pertinent surgical history.    Physical Exam    Recent Vitals 1/5/2021   Height -   Weight 221 lbs 14 oz   BSA (m2) 2.18 m2   /81   Pulse 66   Temp 97.4   Temp src 1   SpO2 97   Some recent data might be hidden       GENERAL: Alert and oriented to time place and person. Seated comfortably. In no distress.    HEAD: Atraumatic and normocephalic.    EYES: VAMSHI, EOMI.  No pallor.  No icterus.    Oral cavity: no mucosal lesion or tonsillar enlargement.    NECK: supple. JVP normal.  No thyroid enlargement.    LYMPH NODES: No palpable, cervical, axillary or inguinal lymphadenopathy.    CHEST: clear to auscultation bilaterally.  Resonant to percussion throughout bilaterally.  Symmetrical breath movements bilaterally.    CVS: S1 and S2 are heard. Regular rate and rhythm.  No murmur or gallop or rub heard.  No peripheral  edema.    ABDOMEN: Soft. Not tender. Not distended.  No palpable hepatomegaly or splenomegaly.  No other mass palpable.  Bowel sounds heard.    EXTREMITIES: Warm.    SKIN: no rash, or bruising or purpura.  Has a full head of hair.      Lab Results    Recent Results (from the past 168 hour(s))   POCT CREATININE   Result Value Ref Range    iSTAT Creatinine 1.1 0.7 - 1.3 mg/dL    iSTAT GFR MDRD Af Amer >60 >60 mL/min/1.73m2    iSTAT GFR MDRD Non Af Amer >60 >60 mL/min/1.73m2       Imaging    Ct Chest With Contrast    Result Date: 1/4/2021  EXAM: CT CHEST W CONTRAST LOCATION: [facility] DATE/TIME: 1/4/2021 1:30 PM INDICATION: fu lung nodules COMPARISON: CT of the chest, abdomen, and pelvis 10/01/2020 TECHNIQUE: CT chest with IV contrast. Multiplanar reformats were obtained. Dose reduction techniques were used. CONTRAST: Iohexol (Omni) 100 mL FINDINGS: LUNGS AND PLEURA: Upper zone predominant centrilobular emphysema. Mild peripheral interstitial thickening in areas with paraseptal emphysema in the upper lobes. There are several subpleural bullae in the left lower lobe along the diaphragmatic pleura. Clustered and branching calcifications in the periphery of the lower lobes have pattern typical for dendriform pulmonary ossification, a benign entity. No airspace opacities or actionable nodules. Trachea and central airways are patent and normal caliber. Mild symmetric cylindrical bronchiectasis is present which is most conspicuous in the lower lobes. No airway wall thickening or inspissated airway material. No pleural space abnormality. MEDIASTINUM: Cardiac chambers are normal in size. No pericardial effusion. Moderate atheromatous calcifications in the proximal left and mild atheromatous calcification of the proximal right coronary arteries. No enlarged mediastinal or hilar lymph nodes. Esophagus is decompressed. Imaged thyroid gland is normal. UPPER ABDOMEN: No significant finding.  MUSCULOSKELETAL: Slight anterior wedging of mid thoracic vertebra. Diffuse thoracic spine degenerative osteophytes. No aggressive or destructive bone lesions. Chest wall soft tissues are symmetric.     1.  Clustered branching high attenuation nodules in the periphery of the lower lobes consistent with dendriform pulmonary ossification. A few peripheral areas of groundglass attenuation/interstitial thickening in the periphery of the upper lobes are unchanged and consistent with parenchymal scarring. There are no actionable pulmonary nodules. 2.  Mixed paraseptal and centrilobular distribution emphysema and unchanged bronchiectasis. No acute airways process.        Signed by: Mariajose Escobar MD        Again, thank you for allowing me to participate in the care of your patient.        Sincerely,        Mariajose Escobar MD

## 2022-09-26 NOTE — PROGRESS NOTES
[facility] Hematology and Oncology Progress Note    Patient: Juvencio Allan  MRN: [id number]  Date of Service: 01/05/2021        Reason for Visit    Chief Complaint   Patient presents with     HE Cancer     Primary adenocarcinoma of ascending colon       Assessment and Plan    T2N0M0, Stage 1 right colon cancer s/p hemicolectomy, 1/30/2020  Iron deficiency anemia, resolved  Pulmonary nodules, resolved    No clinical concern for recurrence of colon cancer.  CEA level pending.  Not interested in colonoscopy.  We will continue observation.  We will see back in 6 months with recheck of labs.    Plan: As above      Measurable Disease:  None     Current Therapy: Non focal      Cancer Staging  Primary adenocarcinoma of ascending colon (H)  Staging form: Colon And Rectum, AJCC 8th Edition  - Clinical stage from 3/9/2020: Stage I (cT2, cN0, cM0) - Signed by Jama Potts MD on 3/9/2020      ECOG Performance   ECOG Performance Status: 1    Distress Assessment  Distress Assessment Score: No distress    Pain           Problem List    1. Primary adenocarcinoma of ascending colon (H)  CEA (Carcinoembryonic Antigen)        CC: Marquise Addison MD    ______________________________________________________________________________    History of Present Illness    Kristofer is here for reevaluation.  Seen 6 months ago.  Almost 3 years out from his surgery.  Doing well.  No abdominal pain.  No change with bowels.  No blood in the stool.  Appetite and weight stable.  Not interested in pursuing further colonoscopy.      January 2021:    Mr. Juvencio Allan returns for f/u.  Doing well. Good appetite and stable weight.  Soft bms but no blood in stool.  Denies any shortness of breath or cough. Had repeat CT scan.    March 2020:    Kristofer is an 79-year-old gentleman recently found to have an adenocarcinoma involving the sending colon.  He initially had been starting to have less energy and decreased stamina since around Thanksgiving.  He  then noticed some blood in the bowel movements.  He presented to his primary care provider on January 29.  Found to have a hemoglobin of 7.7 and evidence of hypovolemia.  He was sent to the emergency room.  Imaging at that time revealed an intussusception at the ileal colic junction.  He underwent a right hemicolectomy on January 30.  Pathology revealed a well-differentiated adenocarcinoma.  33 lymph nodes negative.  He was discharged on February 12.  Presents today to discuss further follow-up cares.  No acute today.  Feeling okay.  No abdominal pain.  Has some ongoing issues with constipation.    Pain Status  Currently in Pain: No/denies    Review of Systems    Constitutional  Constitutional (WDL): All constitutional elements are within defined limits  Neurosensory  Neurosensory (WDL): Exceptions to WDL  Ataxia: Asymptomatic, clinical or diagnostic observations only, intervention not indicated(Slow gait)  Cardiovascular  Cardiovascular (WDL): All cardiovascular elements are within defined limits  Pulmonary  Respiratory (WDL): Exceptions to WDL  Dyspnea: Shortness of breath with moderate exertion  Gastrointestinal  Gastrointestinal (WDL): All gastrointestinal elements are within defined limits  Genitourinary  Genitourinary (WDL): All genitourinary elements are within defined limits  Integumentary  Integumentary (WDL): All integumentary elements are within defined limits  Patient Coping  Patient Coping: Accepting  Distress Assessment  Distress Assessment Score: No distress  Accompanied by  Accompanied by: Alone    Past History  History reviewed. No pertinent past medical history.    History reviewed. No pertinent surgical history.    Physical Exam    Recent Vitals 1/5/2021   Height -   Weight 221 lbs 14 oz   BSA (m2) 2.18 m2   /81   Pulse 66   Temp 97.4   Temp src 1   SpO2 97   Some recent data might be hidden       GENERAL: Alert and oriented to time place and person. Seated comfortably. In no  distress.    HEAD: Atraumatic and normocephalic.    EYES: VAMSHI, EOMI.  No pallor.  No icterus.    Oral cavity: no mucosal lesion or tonsillar enlargement.    NECK: supple. JVP normal.  No thyroid enlargement.    LYMPH NODES: No palpable, cervical, axillary or inguinal lymphadenopathy.    CHEST: clear to auscultation bilaterally.  Resonant to percussion throughout bilaterally.  Symmetrical breath movements bilaterally.    CVS: S1 and S2 are heard. Regular rate and rhythm.  No murmur or gallop or rub heard.  No peripheral edema.    ABDOMEN: Soft. Not tender. Not distended.  No palpable hepatomegaly or splenomegaly.  No other mass palpable.  Bowel sounds heard.    EXTREMITIES: Warm.    SKIN: no rash, or bruising or purpura.  Has a full head of hair.      Lab Results    Recent Results (from the past 168 hour(s))   POCT CREATININE   Result Value Ref Range    iSTAT Creatinine 1.1 0.7 - 1.3 mg/dL    iSTAT GFR MDRD Af Amer >60 >60 mL/min/1.73m2    iSTAT GFR MDRD Non Af Amer >60 >60 mL/min/1.73m2       Imaging    Ct Chest With Contrast    Result Date: 1/4/2021  EXAM: CT CHEST W CONTRAST LOCATION: [facility] [place] DATE/TIME: 1/4/2021 1:30 PM INDICATION: fu lung nodules COMPARISON: CT of the chest, abdomen, and pelvis 10/01/2020 TECHNIQUE: CT chest with IV contrast. Multiplanar reformats were obtained. Dose reduction techniques were used. CONTRAST: Iohexol (Omni) 100 mL FINDINGS: LUNGS AND PLEURA: Upper zone predominant centrilobular emphysema. Mild peripheral interstitial thickening in areas with paraseptal emphysema in the upper lobes. There are several subpleural bullae in the left lower lobe along the diaphragmatic pleura. Clustered and branching calcifications in the periphery of the lower lobes have pattern typical for dendriform pulmonary ossification, a benign entity. No airspace opacities or actionable nodules. Trachea and central airways are patent and normal caliber. Mild symmetric cylindrical  bronchiectasis is present which is most conspicuous in the lower lobes. No airway wall thickening or inspissated airway material. No pleural space abnormality. MEDIASTINUM: Cardiac chambers are normal in size. No pericardial effusion. Moderate atheromatous calcifications in the proximal left and mild atheromatous calcification of the proximal right coronary arteries. No enlarged mediastinal or hilar lymph nodes. Esophagus is decompressed. Imaged thyroid gland is normal. UPPER ABDOMEN: No significant finding. MUSCULOSKELETAL: Slight anterior wedging of mid thoracic vertebra. Diffuse thoracic spine degenerative osteophytes. No aggressive or destructive bone lesions. Chest wall soft tissues are symmetric.     1.  Clustered branching high attenuation nodules in the periphery of the lower lobes consistent with dendriform pulmonary ossification. A few peripheral areas of groundglass attenuation/interstitial thickening in the periphery of the upper lobes are unchanged and consistent with parenchymal scarring. There are no actionable pulmonary nodules. 2.  Mixed paraseptal and centrilobular distribution emphysema and unchanged bronchiectasis. No acute airways process.        Signed by: Mariajose Escobar MD

## 2023-06-02 ENCOUNTER — LAB (OUTPATIENT)
Dept: INFUSION THERAPY | Facility: HOSPITAL | Age: 83
End: 2023-06-02
Attending: INTERNAL MEDICINE
Payer: COMMERCIAL

## 2023-06-02 ENCOUNTER — ONCOLOGY VISIT (OUTPATIENT)
Dept: ONCOLOGY | Facility: HOSPITAL | Age: 83
End: 2023-06-02
Attending: INTERNAL MEDICINE
Payer: COMMERCIAL

## 2023-06-02 VITALS
WEIGHT: 240.5 LBS | BODY MASS INDEX: 36.45 KG/M2 | OXYGEN SATURATION: 95 % | RESPIRATION RATE: 18 BRPM | HEART RATE: 55 BPM | DIASTOLIC BLOOD PRESSURE: 78 MMHG | TEMPERATURE: 97.7 F | SYSTOLIC BLOOD PRESSURE: 174 MMHG | HEIGHT: 68 IN

## 2023-06-02 DIAGNOSIS — C18.2 PRIMARY ADENOCARCINOMA OF ASCENDING COLON (H): ICD-10-CM

## 2023-06-02 DIAGNOSIS — C18.2 PRIMARY ADENOCARCINOMA OF ASCENDING COLON (H): Primary | ICD-10-CM

## 2023-06-02 LAB — CEA SERPL-MCNC: 1.9 NG/ML

## 2023-06-02 PROCEDURE — 99213 OFFICE O/P EST LOW 20 MIN: CPT | Performed by: NURSE PRACTITIONER

## 2023-06-02 PROCEDURE — 82378 CARCINOEMBRYONIC ANTIGEN: CPT

## 2023-06-02 PROCEDURE — G0463 HOSPITAL OUTPT CLINIC VISIT: HCPCS | Performed by: NURSE PRACTITIONER

## 2023-06-02 PROCEDURE — 36415 COLL VENOUS BLD VENIPUNCTURE: CPT

## 2023-06-02 RX ORDER — ATORVASTATIN CALCIUM 20 MG/1
20 TABLET, FILM COATED ORAL DAILY
COMMUNITY
Start: 2023-04-03

## 2023-06-02 RX ORDER — CARBOXYMETHYLCELLULOSE SODIUM 5 MG/ML
1 SOLUTION/ DROPS OPHTHALMIC PRN
COMMUNITY

## 2023-06-02 ASSESSMENT — PAIN SCALES - GENERAL: PAINLEVEL: NO PAIN (0)

## 2023-06-02 NOTE — PROGRESS NOTES
[facility] Hematology and Oncology Progress Note    Patient: Juvencio Allan  MRN: [id number]  Date of Service: Jun 2, 2023          Reason for Visit    Chief Complaint   Patient presents with     Oncology Clinic Visit     6 month follow up with labs related to Primary adenocarcinoma of ascending colon       Assessment and Plan     Cancer Staging   No matching staging information was found for the patient.       1. Colon cancer, T2N0M0, Stage 1 right side, s/p hemicolectomy, 1/30/2020: doing well since then. CEA pending from today. No clinical signs of recurrence. Will continue every 6 month follow ups until 5 years.     2. Iron deficiency anemia, resolved. Will check at next visit to confirm this still looks good.       ECOG Performance    1 - Can't do physically strenuous work, but fully ambyulatory and can do light sedentary work    Distress Screening (within last 30 days)    No data recorded     Pain  Pain Score: No Pain (0)    Problem List    Patient Active Problem List   Diagnosis     Intussusception (H)     Colonic intussusception (H)     Anemia, iron deficiency     Ileus, postoperative (H)     BERNADINE (acute kidney injury) (H)     Status post nasal surgery     Acute kidney failure, unspecified (H)     Iron deficiency anemia due to chronic blood loss     Primary adenocarcinoma of ascending colon (H)        ______________________________________________________________________________    History of Present Illness    Measurable Disease: none post op    Treatment: Right hemicolecomy on 1/30/2020. Observation since then.     Interim History:   Pt is doing well. Has no complaints. Denies changes in his bowels. Denies blood in his stool. Denies weight loss, abd pain. Feels fine. No infectious complaints.     Review of Systems    Pertinent items are noted in HPI.    Past History    No past medical history on file.    PHYSICAL EXAM:  BP (!) 174/78 (BP Location: Left arm, Patient Position: Sitting, Cuff Size: Adult  "Large)   Pulse 55   Temp 97.7  F (36.5  C) (Tympanic)   Resp 18   Ht 1.715 m (5' 7.5\")   Wt 109.1 kg (240 lb 8 oz)   SpO2 95%   BMI 37.11 kg/m    GENERAL: no acute distress. Cooperative in conversation. Here alone  RESP: lungs are clear bilaterally per auscultation. Regular respiratory rate. No wheezes or rhonchi.  CV: Regular, rate and rhythm. No murmurs.  ABD: soft, nontender.    MUSCULOSKELETAL: No lower extremity swelling.   NEURO: non focal. Alert and oriented x3.   PSYCH: within normal limits. No depression or anxiety.  SKIN: warm dry intact   LYMPH: no cervical, supraclavicular lymphadenopathy        Lab Results    No results found for this or any previous visit (from the past 168 hour(s)).  CEA pending    Imaging    No results found.      Signed by: JEREMIAH Medina CNP  "

## 2023-06-02 NOTE — PROGRESS NOTES
"Oncology Rooming Note    June 2, 2023 10:42 AM   Juvencio Allan is a 82 year old male who presents for:    Chief Complaint   Patient presents with     Oncology Clinic Visit     6 month follow up with labs related to Primary adenocarcinoma of ascending colon     Initial Vitals: BP (!) 174/78 (BP Location: Left arm, Patient Position: Sitting, Cuff Size: Adult Large)   Pulse 55   Temp 97.7  F (36.5  C) (Tympanic)   Resp 18   Ht 1.715 m (5' 7.5\")   Wt 109.1 kg (240 lb 8 oz)   SpO2 95%   BMI 37.11 kg/m   Estimated body mass index is 37.11 kg/m  as calculated from the following:    Height as of this encounter: 1.715 m (5' 7.5\").    Weight as of this encounter: 109.1 kg (240 lb 8 oz). Body surface area is 2.28 meters squared.  No Pain (0) Comment: Data Unavailable   No LMP for male patient.  Allergies reviewed: Yes  Medications reviewed: Yes    Medications: Medication refills not needed today.  Pharmacy name entered into [employer]:    [facility] #[id number] - [place], [place] - [address] AT [facility] OF [facility] - [address] - ONE [facility] AT PORTAL TO REGISTERED [facility] SITES    Clinical concerns: 6 month follow up with labs related to Primary adenocarcinoma of ascending colon    KARINA FROST CMA            "

## 2023-06-02 NOTE — LETTER
6/2/2023         RE: Juvencio Allan  [address]  [place] [address]        Dear Colleague,    Thank you for referring your patient, Juvencio Allan, to the [facility] [place]. Please see a copy of my visit note below.    [facility] Hematology and Oncology Progress Note    Patient: Juvencio Allan  MRN: [id number]  Date of Service: Jun 2, 2023          Reason for Visit    Chief Complaint   Patient presents with     Oncology Clinic Visit     6 month follow up with labs related to Primary adenocarcinoma of ascending colon       Assessment and Plan     Cancer Staging   No matching staging information was found for the patient.       1. Colon cancer, T2N0M0, Stage 1 right side, s/p hemicolectomy, 1/30/2020: doing well since then. CEA pending from today. No clinical signs of recurrence. Will continue every 6 month follow ups until 5 years.     2. Iron deficiency anemia, resolved. Will check at next visit to confirm this still looks good.       ECOG Performance    1 - Can't do physically strenuous work, but fully ambyulatory and can do light sedentary work    Distress Screening (within last 30 days)    No data recorded     Pain  Pain Score: No Pain (0)    Problem List    Patient Active Problem List   Diagnosis     Intussusception (H)     Colonic intussusception (H)     Anemia, iron deficiency     Ileus, postoperative (H)     BERNADINE (acute kidney injury) (H)     Status post nasal surgery     Acute kidney failure, unspecified (H)     Iron deficiency anemia due to chronic blood loss     Primary adenocarcinoma of ascending colon (H)        ______________________________________________________________________________    History of Present Illness    Measurable Disease: none post op    Treatment: Right hemicolecomy on 1/30/2020. Observation since then.     Interim History:   Pt is doing well. Has no complaints. Denies changes in his bowels. Denies blood in his stool. Denies weight loss, abd  "pain. Feels fine. No infectious complaints.     Review of Systems    Pertinent items are noted in HPI.    Past History    No past medical history on file.    PHYSICAL EXAM:  BP (!) 174/78 (BP Location: Left arm, Patient Position: Sitting, Cuff Size: Adult Large)   Pulse 55   Temp 97.7  F (36.5  C) (Tympanic)   Resp 18   Ht 1.715 m (5' 7.5\")   Wt 109.1 kg (240 lb 8 oz)   SpO2 95%   BMI 37.11 kg/m    GENERAL: no acute distress. Cooperative in conversation. Here alone  RESP: lungs are clear bilaterally per auscultation. Regular respiratory rate. No wheezes or rhonchi.  CV: Regular, rate and rhythm. No murmurs.  ABD: soft, nontender.    MUSCULOSKELETAL: No lower extremity swelling.   NEURO: non focal. Alert and oriented x3.   PSYCH: within normal limits. No depression or anxiety.  SKIN: warm dry intact   LYMPH: no cervical, supraclavicular lymphadenopathy        Lab Results    No results found for this or any previous visit (from the past 168 hour(s)).  CEA pending    Imaging    No results found.      Signed by: JEREMIAH Medina CNP    Oncology Rooming Note    June 2, 2023 10:42 AM   Juvencio Allan is a 82 year old male who presents for:    Chief Complaint   Patient presents with     Oncology Clinic Visit     6 month follow up with labs related to Primary adenocarcinoma of ascending colon     Initial Vitals: BP (!) 174/78 (BP Location: Left arm, Patient Position: Sitting, Cuff Size: Adult Large)   Pulse 55   Temp 97.7  F (36.5  C) (Tympanic)   Resp 18   Ht 1.715 m (5' 7.5\")   Wt 109.1 kg (240 lb 8 oz)   SpO2 95%   BMI 37.11 kg/m   Estimated body mass index is 37.11 kg/m  as calculated from the following:    Height as of this encounter: 1.715 m (5' 7.5\").    Weight as of this encounter: 109.1 kg (240 lb 8 oz). Body surface area is 2.28 meters squared.  No Pain (0) Comment: Data Unavailable   No LMP for male patient.  Allergies reviewed: Yes  Medications reviewed: Yes    Medications: Medication " refills not needed today.  Pharmacy name entered into [facility]:    [facility] #[id number] - [place], [place] - [address] AT [facility] OF [facility] - [address] - ONE [facility] AT PORTAL TO REGISTERED [facility] SITES    Clinical concerns: 6 month follow up with labs related to Primary adenocarcinoma of ascending colon    KARINA FROST CMA                Again, thank you for allowing me to participate in the care of your patient.        Sincerely,        JEREMIAH Medina CNP

## 2023-06-05 ENCOUNTER — TELEPHONE (OUTPATIENT)
Dept: ONCOLOGY | Facility: HOSPITAL | Age: 83
End: 2023-06-05
Payer: COMMERCIAL

## 2023-06-05 NOTE — TELEPHONE ENCOUNTER
I call Kristofer to report that his CEA level is within normal limits. He is to return for follow up in November. He verbalized understanding and appreciation of our conversation.     Barbara Garcia  RN Care Coordinator  [facility]

## 2023-12-29 ENCOUNTER — TELEPHONE (OUTPATIENT)
Dept: ONCOLOGY | Facility: HOSPITAL | Age: 83
End: 2023-12-29
Payer: COMMERCIAL

## 2024-01-04 ENCOUNTER — LAB (OUTPATIENT)
Dept: INFUSION THERAPY | Facility: HOSPITAL | Age: 84
End: 2024-01-04
Attending: NURSE PRACTITIONER
Payer: COMMERCIAL

## 2024-01-04 ENCOUNTER — ONCOLOGY VISIT (OUTPATIENT)
Dept: ONCOLOGY | Facility: HOSPITAL | Age: 84
End: 2024-01-04
Attending: NURSE PRACTITIONER
Payer: COMMERCIAL

## 2024-01-04 VITALS
TEMPERATURE: 98.5 F | RESPIRATION RATE: 20 BRPM | HEART RATE: 111 BPM | DIASTOLIC BLOOD PRESSURE: 87 MMHG | SYSTOLIC BLOOD PRESSURE: 147 MMHG | OXYGEN SATURATION: 95 % | HEIGHT: 68 IN | WEIGHT: 239 LBS | BODY MASS INDEX: 36.22 KG/M2

## 2024-01-04 DIAGNOSIS — C18.2 PRIMARY ADENOCARCINOMA OF ASCENDING COLON (H): Primary | ICD-10-CM

## 2024-01-04 DIAGNOSIS — C18.2 PRIMARY ADENOCARCINOMA OF ASCENDING COLON (H): ICD-10-CM

## 2024-01-04 LAB
BASOPHILS # BLD AUTO: 0.1 10E3/UL (ref 0–0.2)
BASOPHILS NFR BLD AUTO: 1 %
CEA SERPL-MCNC: 2 NG/ML
EOSINOPHIL # BLD AUTO: 0.3 10E3/UL (ref 0–0.7)
EOSINOPHIL NFR BLD AUTO: 3 %
ERYTHROCYTE [DISTWIDTH] IN BLOOD BY AUTOMATED COUNT: 13.7 % (ref 10–15)
HCT VFR BLD AUTO: 47 % (ref 40–53)
HGB BLD-MCNC: 15.4 G/DL (ref 13.3–17.7)
IMM GRANULOCYTES # BLD: 0 10E3/UL
IMM GRANULOCYTES NFR BLD: 0 %
LYMPHOCYTES # BLD AUTO: 2.8 10E3/UL (ref 0.8–5.3)
LYMPHOCYTES NFR BLD AUTO: 29 %
MCH RBC QN AUTO: 30.7 PG (ref 26.5–33)
MCHC RBC AUTO-ENTMCNC: 32.8 G/DL (ref 31.5–36.5)
MCV RBC AUTO: 94 FL (ref 78–100)
MONOCYTES # BLD AUTO: 0.8 10E3/UL (ref 0–1.3)
MONOCYTES NFR BLD AUTO: 9 %
NEUTROPHILS # BLD AUTO: 5.6 10E3/UL (ref 1.6–8.3)
NEUTROPHILS NFR BLD AUTO: 58 %
NRBC # BLD AUTO: 0 10E3/UL
NRBC BLD AUTO-RTO: 0 /100
PLATELET # BLD AUTO: 285 10E3/UL (ref 150–450)
RBC # BLD AUTO: 5.02 10E6/UL (ref 4.4–5.9)
WBC # BLD AUTO: 9.6 10E3/UL (ref 4–11)

## 2024-01-04 PROCEDURE — 99213 OFFICE O/P EST LOW 20 MIN: CPT | Performed by: NURSE PRACTITIONER

## 2024-01-04 PROCEDURE — 82378 CARCINOEMBRYONIC ANTIGEN: CPT

## 2024-01-04 PROCEDURE — G0463 HOSPITAL OUTPT CLINIC VISIT: HCPCS | Performed by: NURSE PRACTITIONER

## 2024-01-04 PROCEDURE — 85025 COMPLETE CBC W/AUTO DIFF WBC: CPT

## 2024-01-04 PROCEDURE — 36415 COLL VENOUS BLD VENIPUNCTURE: CPT

## 2024-01-04 ASSESSMENT — PAIN SCALES - GENERAL: PAINLEVEL: NO PAIN (0)

## 2024-01-04 NOTE — PROGRESS NOTES
[facility] Hematology and Oncology Progress Note    Patient: Juvencio Allan  MRN: [id number]  Date of Service: Jan 4, 2024          Reason for Visit    Chief Complaint   Patient presents with    Oncology Clinic Visit     6 month return Primary adenocarcinoma of ascending colon, review labs        Assessment and Plan     Cancer Staging   No matching staging information was found for the patient.         1. Colon cancer, T2N0M0, Stage 1 right side, s/p hemicolectomy, 1/30/2020: doing well since then. CEA pending from today. No clinical signs of recurrence. Will continue every 6 month follow ups until 5 years.     2. Iron deficiency anemia, resolved.  He did not tolerate oral iron every day but states he takes it once or twice a week and he seems to tolerate that better.  I did tell him he could stop that if he would like since his levels are looking so good.      ECOG Performance    1 - Can't do physically strenuous work, but fully ambyulatory and can do light sedentary work    Distress Screening (within last 30 days)    No data recorded     Pain  Pain Score: No Pain (0)    Problem List    Patient Active Problem List   Diagnosis    Intussusception (H)    Colonic intussusception (H)    Anemia, iron deficiency    Ileus, postoperative (H)    BERNADINE (acute kidney injury) (H24)    Status post nasal surgery    Acute kidney failure, unspecified (H24)    Iron deficiency anemia due to chronic blood loss    Primary adenocarcinoma of ascending colon (H)        ______________________________________________________________________________    History of Present Illness    Measurable Disease: none post op    Treatment: Right hemicolecomy on 1/30/2020. Observation since then.     Interim History:   Pt is doing well. Has no complaints. Denies changes in his bowels. Denies blood in his stool. Denies weight loss, abd pain. Feels fine. No infectious complaints.     Review of Systems    Pertinent items are noted in HPI.    Past  "History    No past medical history on file.    PHYSICAL EXAM:  BP (!) 147/87 (BP Location: Left arm, Patient Position: Sitting, Cuff Size: Adult Large)   Pulse 111   Temp 98.5  F (36.9  C) (Oral)   Resp 20   Ht 1.715 m (5' 7.52\")   Wt 108.4 kg (239 lb)   SpO2 95%   BMI 36.86 kg/m    GENERAL: no acute distress. Cooperative in conversation. Here alone  RESP: lungs are clear bilaterally per auscultation. Regular respiratory rate. No wheezes or rhonchi.  CV: Regular, rate and rhythm. No murmurs.  ABD: soft, nontender.    MUSCULOSKELETAL: No lower extremity swelling.   NEURO: non focal. Alert and oriented x3.   PSYCH: within normal limits. No depression or anxiety.  SKIN: warm dry intact   LYMPH: no cervical, supraclavicular lymphadenopathy        Lab Results    Recent Results (from the past 168 hour(s))   CBC with platelets and differential   Result Value Ref Range    WBC Count 9.6 4.0 - 11.0 10e3/uL    RBC Count 5.02 4.40 - 5.90 10e6/uL    Hemoglobin 15.4 13.3 - 17.7 g/dL    Hematocrit 47.0 40.0 - 53.0 %    MCV 94 78 - 100 fL    MCH 30.7 26.5 - 33.0 pg    MCHC 32.8 31.5 - 36.5 g/dL    RDW 13.7 10.0 - 15.0 %    Platelet Count 285 150 - 450 10e3/uL    % Neutrophils 58 %    % Lymphocytes 29 %    % Monocytes 9 %    % Eosinophils 3 %    % Basophils 1 %    % Immature Granulocytes 0 %    NRBCs per 100 WBC 0 <1 /100    Absolute Neutrophils 5.6 1.6 - 8.3 10e3/uL    Absolute Lymphocytes 2.8 0.8 - 5.3 10e3/uL    Absolute Monocytes 0.8 0.0 - 1.3 10e3/uL    Absolute Eosinophils 0.3 0.0 - 0.7 10e3/uL    Absolute Basophils 0.1 0.0 - 0.2 10e3/uL    Absolute Immature Granulocytes 0.0 <=0.4 10e3/uL    Absolute NRBCs 0.0 10e3/uL     Lab Results   Component Value Date    GHCEA 1.9 06/02/2023    GHCEA 1.9 09/26/2022    GHCEA 2.5 03/21/2022    GHCEA 2.1 08/02/2021    GHCEA 1.6 10/05/2020   ]    CEA pending    Imaging    No results found.      Signed by: JEREMIAH Medina CNP  "

## 2024-01-04 NOTE — PROGRESS NOTES
"Oncology Rooming Note    January 4, 2024 8:45 AM   Juvencio Allan is a 83 year old male who presents for:    Chief Complaint   Patient presents with    Oncology Clinic Visit     6 month return Primary adenocarcinoma of ascending colon, review labs      Initial Vitals: BP (!) 147/87 (BP Location: Left arm, Patient Position: Sitting, Cuff Size: Adult Large)   Pulse 111   Temp 98.5  F (36.9  C) (Oral)   Resp 20   Ht 1.715 m (5' 7.52\")   Wt 108.4 kg (239 lb)   SpO2 95%   BMI 36.86 kg/m   Estimated body mass index is 36.86 kg/m  as calculated from the following:    Height as of this encounter: 1.715 m (5' 7.52\").    Weight as of this encounter: 108.4 kg (239 lb). Body surface area is 2.27 meters squared.  No Pain (0) Comment: Data Unavailable   No LMP for male patient.  Allergies reviewed: Yes  Medications reviewed: Yes    Medications: Medication refills not needed today.  Pharmacy name entered into [employer]:    [facility] #[id number] - [place], [place] - [address] AT [facility] OF [facility] - [address] - ONE [facility] AT PORTAL TO [facility] SITES    Frailty Screening:   Is the patient here for a new oncology consult visit in cancer care? 1. Yes. Over the past month, have you experienced difficulty or required a caregiver to assist with:   1. Balance, walking or general mobility (including any falls)? NO  2. Completion of self-care tasks such as bathing, dressing, toileting, grooming/hygiene?  NO  3. Concentration or memory that affects your daily life?  NO       Clinical concerns:  6 month return Primary adenocarcinoma of ascending colon, review labs     Hillary Marc CMA              "

## 2024-01-04 NOTE — LETTER
1/4/2024         RE: Juvencio Allan  [address] [facility] [address] [facility] [address]        Dear Colleague,    Thank you for referring your patient, Juvencio Allan, to the [facility] [place]. Please see a copy of my visit note below.    [facility] Hematology and Oncology Progress Note    Patient: Juvencio Allan  MRN: [id number]  Date of Service: Jan 4, 2024          Reason for Visit    Chief Complaint   Patient presents with     Oncology Clinic Visit     6 month return Primary adenocarcinoma of ascending colon, review labs        Assessment and Plan     Cancer Staging   No matching staging information was found for the patient.         1. Colon cancer, T2N0M0, Stage 1 right side, s/p hemicolectomy, 1/30/2020: doing well since then. CEA pending from today. No clinical signs of recurrence. Will continue every 6 month follow ups until 5 years.     2. Iron deficiency anemia, resolved.  He did not tolerate oral iron every day but states he takes it once or twice a week and he seems to tolerate that better.  I did tell him he could stop that if he would like since his levels are looking so good.      ECOG Performance    1 - Can't do physically strenuous work, but fully ambyulatory and can do light sedentary work    Distress Screening (within last 30 days)    No data recorded     Pain  Pain Score: No Pain (0)    Problem List    Patient Active Problem List   Diagnosis     Intussusception (H)     Colonic intussusception (H)     Anemia, iron deficiency     Ileus, postoperative (H)     BERNADINE (acute kidney injury) (H24)     Status post nasal surgery     Acute kidney failure, unspecified (H24)     Iron deficiency anemia due to chronic blood loss     Primary adenocarcinoma of ascending colon (H)        ______________________________________________________________________________    History of Present Illness    Measurable Disease: none post op    Treatment: Right hemicolecomy on 1/30/2020. Observation since  "then.     Interim History:   Pt is doing well. Has no complaints. Denies changes in his bowels. Denies blood in his stool. Denies weight loss, abd pain. Feels fine. No infectious complaints.     Review of Systems    Pertinent items are noted in HPI.    Past History    No past medical history on file.    PHYSICAL EXAM:  BP (!) 147/87 (BP Location: Left arm, Patient Position: Sitting, Cuff Size: Adult Large)   Pulse 111   Temp 98.5  F (36.9  C) (Oral)   Resp 20   Ht 1.715 m (5' 7.52\")   Wt 108.4 kg (239 lb)   SpO2 95%   BMI 36.86 kg/m    GENERAL: no acute distress. Cooperative in conversation. Here alone  RESP: lungs are clear bilaterally per auscultation. Regular respiratory rate. No wheezes or rhonchi.  CV: Regular, rate and rhythm. No murmurs.  ABD: soft, nontender.    MUSCULOSKELETAL: No lower extremity swelling.   NEURO: non focal. Alert and oriented x3.   PSYCH: within normal limits. No depression or anxiety.  SKIN: warm dry intact   LYMPH: no cervical, supraclavicular lymphadenopathy        Lab Results    Recent Results (from the past 168 hour(s))   CBC with platelets and differential   Result Value Ref Range    WBC Count 9.6 4.0 - 11.0 10e3/uL    RBC Count 5.02 4.40 - 5.90 10e6/uL    Hemoglobin 15.4 13.3 - 17.7 g/dL    Hematocrit 47.0 40.0 - 53.0 %    MCV 94 78 - 100 fL    MCH 30.7 26.5 - 33.0 pg    MCHC 32.8 31.5 - 36.5 g/dL    RDW 13.7 10.0 - 15.0 %    Platelet Count 285 150 - 450 10e3/uL    % Neutrophils 58 %    % Lymphocytes 29 %    % Monocytes 9 %    % Eosinophils 3 %    % Basophils 1 %    % Immature Granulocytes 0 %    NRBCs per 100 WBC 0 <1 /100    Absolute Neutrophils 5.6 1.6 - 8.3 10e3/uL    Absolute Lymphocytes 2.8 0.8 - 5.3 10e3/uL    Absolute Monocytes 0.8 0.0 - 1.3 10e3/uL    Absolute Eosinophils 0.3 0.0 - 0.7 10e3/uL    Absolute Basophils 0.1 0.0 - 0.2 10e3/uL    Absolute Immature Granulocytes 0.0 <=0.4 10e3/uL    Absolute NRBCs 0.0 10e3/uL     Lab Results   Component Value Date    NGUYỄN " "1.9 06/02/2023    GHCEA 1.9 09/26/2022    GHCEA 2.5 03/21/2022    GHCEA 2.1 08/02/2021    GHCEA 1.6 10/05/2020   ]    CEA pending    Imaging    No results found.      Signed by: JEREMIAH Medina CNP    Oncology Rooming Note    January 4, 2024 8:45 AM   Juvencio Allan is a 83 year old male who presents for:    Chief Complaint   Patient presents with     Oncology Clinic Visit     6 month return Primary adenocarcinoma of ascending colon, review labs      Initial Vitals: BP (!) 147/87 (BP Location: Left arm, Patient Position: Sitting, Cuff Size: Adult Large)   Pulse 111   Temp 98.5  F (36.9  C) (Oral)   Resp 20   Ht 1.715 m (5' 7.52\")   Wt 108.4 kg (239 lb)   SpO2 95%   BMI 36.86 kg/m   Estimated body mass index is 36.86 kg/m  as calculated from the following:    Height as of this encounter: 1.715 m (5' 7.52\").    Weight as of this encounter: 108.4 kg (239 lb). Body surface area is 2.27 meters squared.  No Pain (0) Comment: Data Unavailable   No LMP for male patient.  Allergies reviewed: Yes  Medications reviewed: Yes    Medications: Medication refills not needed today.  Pharmacy name entered into "[employer]":    [id number] - [place], [place] - [address] AT [facility] - [place] - ONE [facility] AT PORTAL TO [facility] SITES    Frailty Screening:   Is the patient here for a new oncology consult visit in cancer care? 1. Yes. Over the past month, have you experienced difficulty or required a caregiver to assist with:   1. Balance, walking or general mobility (including any falls)? NO  2. Completion of self-care tasks such as bathing, dressing, toileting, grooming/hygiene?  NO  3. Concentration or memory that affects your daily life?  NO       Clinical concerns:  6 month return Primary adenocarcinoma of ascending colon, review labs     Hillary Marc CMA                Again, thank you for allowing me to participate " in the care of your patient.        Sincerely,        JEREMIAH Medina CNP

## 2024-07-09 ENCOUNTER — LAB (OUTPATIENT)
Dept: INFUSION THERAPY | Facility: HOSPITAL | Age: 84
End: 2024-07-09
Attending: INTERNAL MEDICINE
Payer: COMMERCIAL

## 2024-07-09 ENCOUNTER — ONCOLOGY VISIT (OUTPATIENT)
Dept: ONCOLOGY | Facility: HOSPITAL | Age: 84
End: 2024-07-09
Attending: NURSE PRACTITIONER
Payer: COMMERCIAL

## 2024-07-09 VITALS
HEART RATE: 112 BPM | HEIGHT: 68 IN | SYSTOLIC BLOOD PRESSURE: 138 MMHG | RESPIRATION RATE: 22 BRPM | DIASTOLIC BLOOD PRESSURE: 77 MMHG | BODY MASS INDEX: 36.31 KG/M2 | OXYGEN SATURATION: 93 % | TEMPERATURE: 98.3 F | WEIGHT: 239.6 LBS

## 2024-07-09 DIAGNOSIS — C18.2 PRIMARY ADENOCARCINOMA OF ASCENDING COLON (H): Primary | ICD-10-CM

## 2024-07-09 DIAGNOSIS — C18.2 PRIMARY ADENOCARCINOMA OF ASCENDING COLON (H): ICD-10-CM

## 2024-07-09 LAB
BASOPHILS # BLD AUTO: 0 10E3/UL (ref 0–0.2)
BASOPHILS NFR BLD AUTO: 1 %
CEA SERPL-MCNC: 2.1 NG/ML
EOSINOPHIL # BLD AUTO: 0.3 10E3/UL (ref 0–0.7)
EOSINOPHIL NFR BLD AUTO: 3 %
ERYTHROCYTE [DISTWIDTH] IN BLOOD BY AUTOMATED COUNT: 13.4 % (ref 10–15)
HCT VFR BLD AUTO: 42.7 % (ref 40–53)
HGB BLD-MCNC: 14.1 G/DL (ref 13.3–17.7)
IMM GRANULOCYTES # BLD: 0 10E3/UL
IMM GRANULOCYTES NFR BLD: 0 %
LYMPHOCYTES # BLD AUTO: 2.2 10E3/UL (ref 0.8–5.3)
LYMPHOCYTES NFR BLD AUTO: 27 %
MCH RBC QN AUTO: 30.7 PG (ref 26.5–33)
MCHC RBC AUTO-ENTMCNC: 33 G/DL (ref 31.5–36.5)
MCV RBC AUTO: 93 FL (ref 78–100)
MONOCYTES # BLD AUTO: 0.6 10E3/UL (ref 0–1.3)
MONOCYTES NFR BLD AUTO: 7 %
NEUTROPHILS # BLD AUTO: 5.2 10E3/UL (ref 1.6–8.3)
NEUTROPHILS NFR BLD AUTO: 62 %
NRBC # BLD AUTO: 0 10E3/UL
NRBC BLD AUTO-RTO: 0 /100
PLATELET # BLD AUTO: 252 10E3/UL (ref 150–450)
RBC # BLD AUTO: 4.59 10E6/UL (ref 4.4–5.9)
WBC # BLD AUTO: 8.3 10E3/UL (ref 4–11)

## 2024-07-09 PROCEDURE — 99213 OFFICE O/P EST LOW 20 MIN: CPT | Performed by: NURSE PRACTITIONER

## 2024-07-09 PROCEDURE — G0463 HOSPITAL OUTPT CLINIC VISIT: HCPCS | Performed by: NURSE PRACTITIONER

## 2024-07-09 PROCEDURE — 82378 CARCINOEMBRYONIC ANTIGEN: CPT

## 2024-07-09 PROCEDURE — 85041 AUTOMATED RBC COUNT: CPT

## 2024-07-09 PROCEDURE — G2211 COMPLEX E/M VISIT ADD ON: HCPCS | Performed by: NURSE PRACTITIONER

## 2024-07-09 PROCEDURE — 36415 COLL VENOUS BLD VENIPUNCTURE: CPT

## 2024-07-09 ASSESSMENT — PAIN SCALES - GENERAL: PAINLEVEL: NO PAIN (0)

## 2024-07-09 NOTE — PROGRESS NOTES
"Oncology Rooming Note    July 9, 2024 10:50 AM   Juvencio Allan is a 83 year old male who presents for:    Chief Complaint   Patient presents with    Oncology Clinic Visit     6 month return visit with labs related to Primary adenocarcinoma of ascending colon.     Initial Vitals: /77 (BP Location: Right arm, Patient Position: Sitting, Cuff Size: Adult Large)   Pulse 112   Temp 98.3  F (36.8  C) (Oral)   Resp 22   Ht 1.727 m (5' 8\")   Wt 108.7 kg (239 lb 9.6 oz)   SpO2 93%   BMI 36.43 kg/m   Estimated body mass index is 36.43 kg/m  as calculated from the following:    Height as of this encounter: 1.727 m (5' 8\").    Weight as of this encounter: 108.7 kg (239 lb 9.6 oz). Body surface area is 2.28 meters squared.  No Pain (0) Comment: Data Unavailable   No LMP for male patient.  Allergies reviewed: Yes  Medications reviewed: Yes    Medications: Medication refills not needed today.  Pharmacy name entered into [employer]:    [id number] - [place], [place] - [address] AT [facility] [place] - ONE [facility] AT PORTAL TO [facility]    Frailty Screening:   Is the patient here for a new oncology consult visit in cancer care? 2. No      Clinical concerns: Kristofer states that his PCP at [facility] states that they are not receiving his labs. He would like to confirm that they receive his labs from today.  Eliza was notified.      Odalis Astudillo CMA              "

## 2024-07-09 NOTE — LETTER
7/9/2024      Juvencio Allan  [address] [facility] [address]      Dear Colleague,    Thank you for referring your patient, Juvencio Allan, to the [facility] [place]. Please see a copy of my visit note below.    [facility] Hematology and Oncology Progress Note    Patient: Juvencio Allan  MRN: [id number]  Date of Service: Jul 9, 2024          Reason for Visit    Chief Complaint   Patient presents with     Oncology Clinic Visit     6 month return visit with labs related to Primary adenocarcinoma of ascending colon.       Assessment and Plan     Cancer Staging   No matching staging information was found for the patient.         1. Colon cancer, T2N0M0, Stage 1 right side, s/p hemicolectomy, 1/30/2020: doing well since then. CEA pending from today. No clinical signs of recurrence. Will continue every 6 month follow ups until 5 years. He will return in 6 months, which will be 5 years out and if doing well, will then be discharged.     2. Iron deficiency anemia, resolved.  He did not tolerate oral iron every day but states he takes it once or twice a week and he seems to tolerate that better.  I did tell him he could stop that if he would like since his levels are looking so good but he would like to continue.       ECOG Performance    1 - Can't do physically strenuous work, but fully ambyulatory and can do light sedentary work    Distress Screening (within last 30 days)    No data recorded     Pain  Pain Score: No Pain (0)    Problem List    Patient Active Problem List   Diagnosis     Intussusception (H)     Colonic intussusception (H)     Anemia, iron deficiency     Ileus, postoperative (H)     BERNADINE (acute kidney injury) (H24)     Status post nasal surgery     Acute kidney failure, unspecified (H24)     Iron deficiency anemia due to chronic blood loss     Primary adenocarcinoma of ascending colon (H)     "    ______________________________________________________________________________    History of Present Illness    Measurable Disease: none post op    Treatment: Right hemicolecomy on 1/30/2020. Observation since then.     Interim History:   Pt is doing well. Has no complaints. Denies changes in his bowels. Denies blood in his stool. Denies weight loss, abd pain. Feels fine. No infectious complaints.     Review of Systems    Pertinent items are noted in HPI.    Past History    No past medical history on file.    PHYSICAL EXAM:  /77 (BP Location: Right arm, Patient Position: Sitting, Cuff Size: Adult Large)   Pulse 112   Temp 98.3  F (36.8  C) (Oral)   Resp 22   Ht 1.727 m (5' 8\")   Wt 108.7 kg (239 lb 9.6 oz)   SpO2 93%   BMI 36.43 kg/m    GENERAL: no acute distress. Cooperative in conversation. Here alone  RESP: lungs are clear bilaterally per auscultation. Regular respiratory rate. No wheezes or rhonchi.  CV: Regular, rate and rhythm. No murmurs.  ABD: soft, nontender.    MUSCULOSKELETAL: No lower extremity swelling.   NEURO: non focal. Alert and oriented x3.   PSYCH: within normal limits. No depression or anxiety.  SKIN: warm dry intact   LYMPH: no cervical, supraclavicular lymphadenopathy        Lab Results    Recent Results (from the past 168 hour(s))   CBC with platelets and differential   Result Value Ref Range    WBC Count 8.3 4.0 - 11.0 10e3/uL    RBC Count 4.59 4.40 - 5.90 10e6/uL    Hemoglobin 14.1 13.3 - 17.7 g/dL    Hematocrit 42.7 40.0 - 53.0 %    MCV 93 78 - 100 fL    MCH 30.7 26.5 - 33.0 pg    MCHC 33.0 31.5 - 36.5 g/dL    RDW 13.4 10.0 - 15.0 %    Platelet Count 252 150 - 450 10e3/uL    % Neutrophils 62 %    % Lymphocytes 27 %    % Monocytes 7 %    % Eosinophils 3 %    % Basophils 1 %    % Immature Granulocytes 0 %    NRBCs per 100 WBC 0 <1 /100    Absolute Neutrophils 5.2 1.6 - 8.3 10e3/uL    Absolute Lymphocytes 2.2 0.8 - 5.3 10e3/uL    Absolute Monocytes 0.6 0.0 - 1.3 10e3/uL    " "Absolute Eosinophils 0.3 0.0 - 0.7 10e3/uL    Absolute Basophils 0.0 0.0 - 0.2 10e3/uL    Absolute Immature Granulocytes 0.0 <=0.4 10e3/uL    Absolute NRBCs 0.0 10e3/uL       Lab Results   Component Value Date    GHCEA 2.0 01/04/2024    GHCEA 1.9 06/02/2023    GHCEA 1.9 09/26/2022    GHCEA 2.5 03/21/2022    GHCEA 2.1 08/02/2021    GHCEA 1.6 10/05/2020   ]    CEA pending    Imaging    No results found.      Signed by: JEREMIAH Medina CNP    Oncology Rooming Note    July 9, 2024 10:50 AM   Juvencio Allan is a 83 year old male who presents for:    Chief Complaint   Patient presents with     Oncology Clinic Visit     6 month return visit with labs related to Primary adenocarcinoma of ascending colon.     Initial Vitals: /77 (BP Location: Right arm, Patient Position: Sitting, Cuff Size: Adult Large)   Pulse 112   Temp 98.3  F (36.8  C) (Oral)   Resp 22   Ht 1.727 m (5' 8\")   Wt 108.7 kg (239 lb 9.6 oz)   SpO2 93%   BMI 36.43 kg/m   Estimated body mass index is 36.43 kg/m  as calculated from the following:    Height as of this encounter: 1.727 m (5' 8\").    Weight as of this encounter: 108.7 kg (239 lb 9.6 oz). Body surface area is 2.28 meters squared.  No Pain (0) Comment: Data Unavailable   No LMP for male patient.  Allergies reviewed: Yes  Medications reviewed: Yes    Medications: Medication refills not needed today.  Pharmacy name entered into [employer]:    [facility][id number] - [place], [place] - [address] AT [facility] OF [address]  [facility] - [place] - ONE [facility] AT PORTAL TO REGISTERED [facility] SITES    Frailty Screening:   Is the patient here for a new oncology consult visit in cancer care? 2. No      Clinical concerns: Kristofer states that his PCP at [facility] states that they are not receiving his labs. He would like to confirm that they receive his labs from today.  Eliza was notified.      Odalis Astudillo, CMA                Again, thank you " for allowing me to participate in the care of your patient.        Sincerely,        JEREMIAH Medina CNP

## 2024-07-09 NOTE — PROGRESS NOTES
[facility] Hematology and Oncology Progress Note    Patient: Juvencio Allan  MRN: [id number]  Date of Service: Jul 9, 2024          Reason for Visit    Chief Complaint   Patient presents with    Oncology Clinic Visit     6 month return visit with labs related to Primary adenocarcinoma of ascending colon.       Assessment and Plan     Cancer Staging   No matching staging information was found for the patient.         1. Colon cancer, T2N0M0, Stage 1 right side, s/p hemicolectomy, 1/30/2020: doing well since then. CEA pending from today. No clinical signs of recurrence. Will continue every 6 month follow ups until 5 years. He will return in 6 months, which will be 5 years out and if doing well, will then be discharged.     2. Iron deficiency anemia, resolved.  He did not tolerate oral iron every day but states he takes it once or twice a week and he seems to tolerate that better.  I did tell him he could stop that if he would like since his levels are looking so good but he would like to continue.       ECOG Performance    1 - Can't do physically strenuous work, but fully ambyulatory and can do light sedentary work    Distress Screening (within last 30 days)    No data recorded     Pain  Pain Score: No Pain (0)    Problem List    Patient Active Problem List   Diagnosis    Intussusception (H)    Colonic intussusception (H)    Anemia, iron deficiency    Ileus, postoperative (H)    BERNADINE (acute kidney injury) (H24)    Status post nasal surgery    Acute kidney failure, unspecified (H24)    Iron deficiency anemia due to chronic blood loss    Primary adenocarcinoma of ascending colon (H)        ______________________________________________________________________________    History of Present Illness    Measurable Disease: none post op    Treatment: Right hemicolecomy on 1/30/2020. Observation since then.     Interim History:   Pt is doing well. Has no complaints. Denies changes in his bowels. Denies blood in his  "stool. Denies weight loss, abd pain. Feels fine. No infectious complaints.     Review of Systems    Pertinent items are noted in HPI.    Past History    No past medical history on file.    PHYSICAL EXAM:  /77 (BP Location: Right arm, Patient Position: Sitting, Cuff Size: Adult Large)   Pulse 112   Temp 98.3  F (36.8  C) (Oral)   Resp 22   Ht 1.727 m (5' 8\")   Wt 108.7 kg (239 lb 9.6 oz)   SpO2 93%   BMI 36.43 kg/m    GENERAL: no acute distress. Cooperative in conversation. Here alone  RESP: lungs are clear bilaterally per auscultation. Regular respiratory rate. No wheezes or rhonchi.  CV: Regular, rate and rhythm. No murmurs.  ABD: soft, nontender.    MUSCULOSKELETAL: No lower extremity swelling.   NEURO: non focal. Alert and oriented x3.   PSYCH: within normal limits. No depression or anxiety.  SKIN: warm dry intact   LYMPH: no cervical, supraclavicular lymphadenopathy        Lab Results    Recent Results (from the past 168 hour(s))   CBC with platelets and differential   Result Value Ref Range    WBC Count 8.3 4.0 - 11.0 10e3/uL    RBC Count 4.59 4.40 - 5.90 10e6/uL    Hemoglobin 14.1 13.3 - 17.7 g/dL    Hematocrit 42.7 40.0 - 53.0 %    MCV 93 78 - 100 fL    MCH 30.7 26.5 - 33.0 pg    MCHC 33.0 31.5 - 36.5 g/dL    RDW 13.4 10.0 - 15.0 %    Platelet Count 252 150 - 450 10e3/uL    % Neutrophils 62 %    % Lymphocytes 27 %    % Monocytes 7 %    % Eosinophils 3 %    % Basophils 1 %    % Immature Granulocytes 0 %    NRBCs per 100 WBC 0 <1 /100    Absolute Neutrophils 5.2 1.6 - 8.3 10e3/uL    Absolute Lymphocytes 2.2 0.8 - 5.3 10e3/uL    Absolute Monocytes 0.6 0.0 - 1.3 10e3/uL    Absolute Eosinophils 0.3 0.0 - 0.7 10e3/uL    Absolute Basophils 0.0 0.0 - 0.2 10e3/uL    Absolute Immature Granulocytes 0.0 <=0.4 10e3/uL    Absolute NRBCs 0.0 10e3/uL       Lab Results   Component Value Date    GHCEA 2.0 01/04/2024    GHCEA 1.9 06/02/2023    GHCEA 1.9 09/26/2022    GHCEA 2.5 03/21/2022    GHCEA 2.1 08/02/2021    " GHCEA 1.6 10/05/2020   ]    CEA pending    Imaging    No results found.      Signed by: JEREMIAH Medina CNP

## 2024-07-30 ENCOUNTER — LAB REQUISITION (OUTPATIENT)
Dept: LAB | Facility: CLINIC | Age: 84
End: 2024-07-30
Payer: COMMERCIAL

## 2024-07-30 DIAGNOSIS — I10 ESSENTIAL (PRIMARY) HYPERTENSION: ICD-10-CM

## 2024-07-30 DIAGNOSIS — E78.5 HYPERLIPIDEMIA, UNSPECIFIED: ICD-10-CM

## 2024-07-30 LAB
ALBUMIN SERPL BCG-MCNC: 4.4 G/DL (ref 3.5–5.2)
ALP SERPL-CCNC: 97 U/L (ref 40–150)
ALT SERPL W P-5'-P-CCNC: 7 U/L (ref 0–70)
ANION GAP SERPL CALCULATED.3IONS-SCNC: 13 MMOL/L (ref 7–15)
AST SERPL W P-5'-P-CCNC: 15 U/L (ref 0–45)
BILIRUB SERPL-MCNC: 0.8 MG/DL
BUN SERPL-MCNC: 29.1 MG/DL (ref 8–23)
CALCIUM SERPL-MCNC: 9.3 MG/DL (ref 8.8–10.4)
CHLORIDE SERPL-SCNC: 105 MMOL/L (ref 98–107)
CHOLEST SERPL-MCNC: 127 MG/DL
CREAT SERPL-MCNC: 1.26 MG/DL (ref 0.67–1.17)
EGFRCR SERPLBLD CKD-EPI 2021: 56 ML/MIN/1.73M2
FASTING STATUS PATIENT QL REPORTED: ABNORMAL
FASTING STATUS PATIENT QL REPORTED: NORMAL
GLUCOSE SERPL-MCNC: 112 MG/DL (ref 70–99)
HCO3 SERPL-SCNC: 22 MMOL/L (ref 22–29)
HDLC SERPL-MCNC: 54 MG/DL
LDLC SERPL CALC-MCNC: 45 MG/DL
NONHDLC SERPL-MCNC: 73 MG/DL
POTASSIUM SERPL-SCNC: 4.6 MMOL/L (ref 3.4–5.3)
PROT SERPL-MCNC: 7.5 G/DL (ref 6.4–8.3)
SODIUM SERPL-SCNC: 140 MMOL/L (ref 135–145)
TRIGL SERPL-MCNC: 141 MG/DL

## 2024-07-30 PROCEDURE — 80053 COMPREHEN METABOLIC PANEL: CPT | Mod: ORL | Performed by: STUDENT IN AN ORGANIZED HEALTH CARE EDUCATION/TRAINING PROGRAM

## 2024-07-30 PROCEDURE — 80061 LIPID PANEL: CPT | Mod: ORL | Performed by: STUDENT IN AN ORGANIZED HEALTH CARE EDUCATION/TRAINING PROGRAM

## 2024-12-31 ENCOUNTER — TELEPHONE (OUTPATIENT)
Dept: ONCOLOGY | Facility: HOSPITAL | Age: 84
End: 2024-12-31
Payer: COMMERCIAL

## 2025-02-03 ENCOUNTER — LAB (OUTPATIENT)
Dept: INFUSION THERAPY | Facility: HOSPITAL | Age: 85
End: 2025-02-03
Payer: COMMERCIAL

## 2025-02-03 ENCOUNTER — PATIENT OUTREACH (OUTPATIENT)
Dept: ONCOLOGY | Facility: HOSPITAL | Age: 85
End: 2025-02-03

## 2025-02-03 ENCOUNTER — ONCOLOGY VISIT (OUTPATIENT)
Dept: ONCOLOGY | Facility: HOSPITAL | Age: 85
End: 2025-02-03
Payer: COMMERCIAL

## 2025-02-03 VITALS
WEIGHT: 248.8 LBS | BODY MASS INDEX: 37.83 KG/M2 | DIASTOLIC BLOOD PRESSURE: 70 MMHG | HEART RATE: 80 BPM | SYSTOLIC BLOOD PRESSURE: 152 MMHG | RESPIRATION RATE: 16 BRPM | TEMPERATURE: 98 F | OXYGEN SATURATION: 95 %

## 2025-02-03 DIAGNOSIS — C18.2 PRIMARY ADENOCARCINOMA OF ASCENDING COLON (H): Primary | ICD-10-CM

## 2025-02-03 DIAGNOSIS — M79.89 SWELLING OF LIMB: ICD-10-CM

## 2025-02-03 DIAGNOSIS — C18.2 PRIMARY ADENOCARCINOMA OF ASCENDING COLON (H): ICD-10-CM

## 2025-02-03 LAB
BASOPHILS # BLD AUTO: 0.1 10E3/UL (ref 0–0.2)
BASOPHILS NFR BLD AUTO: 1 %
CEA SERPL-MCNC: 2.3 NG/ML
EOSINOPHIL # BLD AUTO: 0.4 10E3/UL (ref 0–0.7)
EOSINOPHIL NFR BLD AUTO: 5 %
ERYTHROCYTE [DISTWIDTH] IN BLOOD BY AUTOMATED COUNT: 14.2 % (ref 10–15)
HCT VFR BLD AUTO: 39.1 % (ref 40–53)
HGB BLD-MCNC: 12.9 G/DL (ref 13.3–17.7)
IMM GRANULOCYTES # BLD: 0 10E3/UL
IMM GRANULOCYTES NFR BLD: 0 %
LYMPHOCYTES # BLD AUTO: 2.3 10E3/UL (ref 0.8–5.3)
LYMPHOCYTES NFR BLD AUTO: 30 %
MCH RBC QN AUTO: 31.2 PG (ref 26.5–33)
MCHC RBC AUTO-ENTMCNC: 33 G/DL (ref 31.5–36.5)
MCV RBC AUTO: 95 FL (ref 78–100)
MONOCYTES # BLD AUTO: 0.8 10E3/UL (ref 0–1.3)
MONOCYTES NFR BLD AUTO: 10 %
NEUTROPHILS # BLD AUTO: 4.2 10E3/UL (ref 1.6–8.3)
NEUTROPHILS NFR BLD AUTO: 54 %
NRBC # BLD AUTO: 0 10E3/UL
NRBC BLD AUTO-RTO: 0 /100
PLATELET # BLD AUTO: 249 10E3/UL (ref 150–450)
RBC # BLD AUTO: 4.13 10E6/UL (ref 4.4–5.9)
WBC # BLD AUTO: 7.8 10E3/UL (ref 4–11)

## 2025-02-03 PROCEDURE — 99213 OFFICE O/P EST LOW 20 MIN: CPT | Performed by: NURSE PRACTITIONER

## 2025-02-03 PROCEDURE — 36415 COLL VENOUS BLD VENIPUNCTURE: CPT

## 2025-02-03 PROCEDURE — 85004 AUTOMATED DIFF WBC COUNT: CPT

## 2025-02-03 PROCEDURE — G0463 HOSPITAL OUTPT CLINIC VISIT: HCPCS | Performed by: NURSE PRACTITIONER

## 2025-02-03 PROCEDURE — 82378 CARCINOEMBRYONIC ANTIGEN: CPT

## 2025-02-03 RX ORDER — FUROSEMIDE 20 MG/1
20 TABLET ORAL DAILY PRN
Qty: 20 TABLET | Refills: 0 | Status: SHIPPED | OUTPATIENT
Start: 2025-02-03

## 2025-02-03 RX ORDER — MALATHION 0 G/ML
LOTION TOPICAL
COMMUNITY
Start: 2024-08-01

## 2025-02-03 ASSESSMENT — PAIN SCALES - GENERAL: PAINLEVEL_OUTOF10: NO PAIN (0)

## 2025-02-03 NOTE — PROGRESS NOTES
[facility] Hematology and Oncology Progress Note    Patient: Juvencio Allan  MRN: [id number]  Date of Service: Feb 3, 2025          Reason for Visit    Chief Complaint   Patient presents with    Oncology Clinic Visit     Return visit 6 months with lab. Primary adenocarcinoma of ascending colon.       Assessment and Plan     Cancer Staging   No matching staging information was found for the patient.         1. Colon cancer, T2N0M0, Stage 1 right side, s/p hemicolectomy, 1/30/2020: doing well since then. CEA pending from today. No clinical signs of recurrence. He is now at 5 years and would like to be discharged from the clinic. No signs of recurrence so he will be discharged. Encourage him to call us or PCP with bowel changes, breathing issues, weight loss.     2. Iron deficiency anemia, resolved.  He did not tolerate oral iron every day. He states he is only taking it about 1 time a week. Encourage him to take more frequently. I did offer him a visit to return in 6 months to repeat labs, but he declined. Prefers to just follow up with PCP intermittently.     3. Bilateral feet swelling: No Redness or warmth.  He states that he probably does not eat the healthiest and does like salty foods.  I encouraged him to try to decrease his salt intake.  I will give him a low-dose of furosemide to use intermittently if his feet do get more swollen.  Also encouraged him to use compression elevation as needed.  Monitor For signs of hypotension.      ECOG Performance    1 - Can't do physically strenuous work, but fully ambyulatory and can do light sedentary work    Distress Screening (within last 30 days)    No data recorded     Pain  Pain Score: No Pain (0)    Problem List    Patient Active Problem List   Diagnosis    Intussusception (H)    Colonic intussusception (H)    Anemia, iron deficiency    Ileus, postoperative (H)    BERNADINE (acute kidney injury)    Status post nasal surgery    Acute kidney failure, unspecified    Iron  deficiency anemia due to chronic blood loss    Primary adenocarcinoma of ascending colon (H)        ______________________________________________________________________________    History of Present Illness    Oncologist: Dr. murray    Measurable Disease: none post op    Treatment: Right hemicolecomy on 1/30/2020. Observation since then.   Iron deficiency anemia: oral iron.     Interim History:   Pt is here today for a follow up visit.  Overall he feels like he is doing well.  Really no changes.  No changes in his bowels.  His weight is stable.  He says he does notice that he does have some intermittent swelling in his ankles and lower legs.  No pain redness or warmth.  He states that he does not probably eat the healthiest and does like to have chips every day.  He states that he ate a lot over the holidays.  Says he does elevate his legs when they do get swollen and that helps.  She denies any breathing issues.  He says he does not get out of the house as much as he used to and really does not drive much anymore.      Review of Systems    Pertinent items are noted in HPI.    Past History    No past medical history on file.    PHYSICAL EXAM:  BP (!) 152/70 (BP Location: Right arm, Patient Position: Sitting, Cuff Size: Adult Large)   Pulse 80   Temp 98  F (36.7  C) (Oral)   Resp 16   Wt 112.9 kg (248 lb 12.8 oz)   SpO2 95%   BMI 37.83 kg/m    GENERAL: no acute distress. Cooperative in conversation. Here alone  RESP: lungs are clear bilaterally per auscultation. Regular respiratory rate. No wheezes or rhonchi.  CV: Regular, rate and rhythm. No murmurs.  ABD: soft, nontender.    MUSCULOSKELETAL: Bilateral lower extremity swelling.   NEURO: non focal. Alert and oriented x3.   PSYCH: within normal limits. No depression or anxiety.  SKIN: warm dry intact   LYMPH: no cervical, supraclavicular lymphadenopathy        Lab Results    Recent Results (from the past week)   CBC with platelets and differential   Result  Value Ref Range    WBC Count 7.8 4.0 - 11.0 10e3/uL    RBC Count 4.13 (L) 4.40 - 5.90 10e6/uL    Hemoglobin 12.9 (L) 13.3 - 17.7 g/dL    Hematocrit 39.1 (L) 40.0 - 53.0 %    MCV 95 78 - 100 fL    MCH 31.2 26.5 - 33.0 pg    MCHC 33.0 31.5 - 36.5 g/dL    RDW 14.2 10.0 - 15.0 %    Platelet Count 249 150 - 450 10e3/uL    % Neutrophils 54 %    % Lymphocytes 30 %    % Monocytes 10 %    % Eosinophils 5 %    % Basophils 1 %    % Immature Granulocytes 0 %    NRBCs per 100 WBC 0 <1 /100    Absolute Neutrophils 4.2 1.6 - 8.3 10e3/uL    Absolute Lymphocytes 2.3 0.8 - 5.3 10e3/uL    Absolute Monocytes 0.8 0.0 - 1.3 10e3/uL    Absolute Eosinophils 0.4 0.0 - 0.7 10e3/uL    Absolute Basophils 0.1 0.0 - 0.2 10e3/uL    Absolute Immature Granulocytes 0.0 <=0.4 10e3/uL    Absolute NRBCs 0.0 10e3/uL         Lab Results   Component Value Date    GHCEA 2.1 07/09/2024    GHCEA 2.0 01/04/2024    GHCEA 1.9 06/02/2023    GHCEA 1.9 09/26/2022    GHCEA 2.5 03/21/2022    GHCEA 2.1 08/02/2021    GHCEA 1.6 10/05/2020   ]    CEA pending, will call him if this is elevated    Imaging    No results found.      Signed by: JEREMIAH Medina CNP

## 2025-02-03 NOTE — PROGRESS NOTES
"Oncology Rooming Note    February 3, 2025 11:10 AM   Juvencio Allan is a 84 year old male who presents for:    Chief Complaint   Patient presents with    Oncology Clinic Visit     Return visit 6 months with lab. Primary adenocarcinoma of ascending colon.     Initial Vitals: BP (!) 152/70 (BP Location: Right arm, Patient Position: Sitting, Cuff Size: Adult Large)   Pulse 80   Temp 98  F (36.7  C) (Oral)   Resp 16   Wt 112.9 kg (248 lb 12.8 oz)   SpO2 95%   BMI 37.83 kg/m   Estimated body mass index is 37.83 kg/m  as calculated from the following:    Height as of 7/9/24: 1.727 m (5' 8\").    Weight as of this encounter: 112.9 kg (248 lb 12.8 oz). Body surface area is 2.33 meters squared.  No Pain (0) Comment: Data Unavailable   No LMP for male patient.  Allergies reviewed: Yes  Medications reviewed: Yes    Medications: Medication refills not needed today.  Pharmacy name entered into Gl[employer]:    [id number] - [place], [place] - [address] AT [facility] OF [address] [facility] - [address] - ONE [facility] AT PORTAL TO REGISTERED [facility] SITES    Frailty Screening:   Is the patient here for a new oncology consult visit in cancer care? 2. No      Clinical concerns: none       Love Jennings CMA              "

## 2025-02-03 NOTE — LETTER
2/3/2025      Juvencio Allan  [address] [address]      Dear Colleague,    Thank you for referring your patient, Juvencio Allan, to the [facility] [place]. Please see a copy of my visit note below.    [facility] Hematology and Oncology Progress Note    Patient: Juvencio Allan  MRN: [id number]  Date of Service: Feb 3, 2025          Reason for Visit    Chief Complaint   Patient presents with     Oncology Clinic Visit     Return visit 6 months with lab. Primary adenocarcinoma of ascending colon.       Assessment and Plan     Cancer Staging   No matching staging information was found for the patient.         1. Colon cancer, T2N0M0, Stage 1 right side, s/p hemicolectomy, 1/30/2020: doing well since then. CEA pending from today. No clinical signs of recurrence. He is now at 5 years and would like to be discharged from the clinic. No signs of recurrence so he will be discharged. Encourage him to call us or PCP with bowel changes, breathing issues, weight loss.     2. Iron deficiency anemia, resolved.  He did not tolerate oral iron every day. He states he is only taking it about 1 time a week. Encourage him to take more frequently. I did offer him a visit to return in 6 months to repeat labs, but he declined. Prefers to just follow up with PCP intermittently.     3. Bilateral feet swelling: No Redness or warmth.  He states that he probably does not eat the healthiest and does like salty foods.  I encouraged him to try to decrease his salt intake.  I will give him a low-dose of furosemide to use intermittently if his feet do get more swollen.  Also encouraged him to use compression elevation as needed.  Monitor For signs of hypotension.      ECOG Performance    1 - Can't do physically strenuous work, but fully ambyulatory and can do light sedentary work    Distress Screening (within last 30 days)    No data recorded     Pain  Pain Score: No Pain (0)    Problem List    Patient  Active Problem List   Diagnosis     Intussusception (H)     Colonic intussusception (H)     Anemia, iron deficiency     Ileus, postoperative (H)     BERNADINE (acute kidney injury)     Status post nasal surgery     Acute kidney failure, unspecified     Iron deficiency anemia due to chronic blood loss     Primary adenocarcinoma of ascending colon (H)        ______________________________________________________________________________    History of Present Illness    Oncologist: Dr. murray    Measurable Disease: none post op    Treatment: Right hemicolecomy on 1/30/2020. Observation since then.   Iron deficiency anemia: oral iron.     Interim History:   Pt is here today for a follow up visit.  Overall he feels like he is doing well.  Really no changes.  No changes in his bowels.  His weight is stable.  He says he does notice that he does have some intermittent swelling in his ankles and lower legs.  No pain redness or warmth.  He states that he does not probably eat the healthiest and does like to have chips every day.  He states that he ate a lot over the holidays.  Says he does elevate his legs when they do get swollen and that helps.  She denies any breathing issues.  He says he does not get out of the house as much as he used to and really does not drive much anymore.      Review of Systems    Pertinent items are noted in HPI.    Past History    No past medical history on file.    PHYSICAL EXAM:  BP (!) 152/70 (BP Location: Right arm, Patient Position: Sitting, Cuff Size: Adult Large)   Pulse 80   Temp 98  F (36.7  C) (Oral)   Resp 16   Wt 112.9 kg (248 lb 12.8 oz)   SpO2 95%   BMI 37.83 kg/m    GENERAL: no acute distress. Cooperative in conversation. Here alone  RESP: lungs are clear bilaterally per auscultation. Regular respiratory rate. No wheezes or rhonchi.  CV: Regular, rate and rhythm. No murmurs.  ABD: soft, nontender.    MUSCULOSKELETAL: Bilateral lower extremity swelling.   NEURO: non focal. Alert  and oriented x3.   PSYCH: within normal limits. No depression or anxiety.  SKIN: warm dry intact   LYMPH: no cervical, supraclavicular lymphadenopathy        Lab Results    Recent Results (from the past week)   CBC with platelets and differential   Result Value Ref Range    WBC Count 7.8 4.0 - 11.0 10e3/uL    RBC Count 4.13 (L) 4.40 - 5.90 10e6/uL    Hemoglobin 12.9 (L) 13.3 - 17.7 g/dL    Hematocrit 39.1 (L) 40.0 - 53.0 %    MCV 95 78 - 100 fL    MCH 31.2 26.5 - 33.0 pg    MCHC 33.0 31.5 - 36.5 g/dL    RDW 14.2 10.0 - 15.0 %    Platelet Count 249 150 - 450 10e3/uL    % Neutrophils 54 %    % Lymphocytes 30 %    % Monocytes 10 %    % Eosinophils 5 %    % Basophils 1 %    % Immature Granulocytes 0 %    NRBCs per 100 WBC 0 <1 /100    Absolute Neutrophils 4.2 1.6 - 8.3 10e3/uL    Absolute Lymphocytes 2.3 0.8 - 5.3 10e3/uL    Absolute Monocytes 0.8 0.0 - 1.3 10e3/uL    Absolute Eosinophils 0.4 0.0 - 0.7 10e3/uL    Absolute Basophils 0.1 0.0 - 0.2 10e3/uL    Absolute Immature Granulocytes 0.0 <=0.4 10e3/uL    Absolute NRBCs 0.0 10e3/uL         Lab Results   Component Value Date    GHCEA 2.1 07/09/2024    GHCEA 2.0 01/04/2024    GHCEA 1.9 06/02/2023    GHCEA 1.9 09/26/2022    GHCEA 2.5 03/21/2022    GHCEA 2.1 08/02/2021    GHCEA 1.6 10/05/2020   ]    CEA pending, will call him if this is elevated    Imaging    No results found.      Signed by: JEREMIAH Medina CNP    Oncology Rooming Note    February 3, 2025 11:10 AM   Juvencio Allan is a 84 year old male who presents for:    Chief Complaint   Patient presents with     Oncology Clinic Visit     Return visit 6 months with lab. Primary adenocarcinoma of ascending colon.     Initial Vitals: BP (!) 152/70 (BP Location: Right arm, Patient Position: Sitting, Cuff Size: Adult Large)   Pulse 80   Temp 98  F (36.7  C) (Oral)   Resp 16   Wt 112.9 kg (248 lb 12.8 oz)   SpO2 95%   BMI 37.83 kg/m   Estimated body mass index is 37.83 kg/m  as calculated from the  "following:    Height as of 7/9/24: 1.727 m (5' 8\").    Weight as of this encounter: 112.9 kg (248 lb 12.8 oz). Body surface area is 2.33 meters squared.  No Pain (0) Comment: Data Unavailable   No LMP for male patient.  Allergies reviewed: Yes  Medications reviewed: Yes    Medications: Medication refills not needed today.  Pharmacy name entered into [employer]:    [id number] - [place], [place] - [address] AT [facility] [address] - [address] [facility] AT PORTAL TO REGISTERED [facility] SITES    Frailty Screening:   Is the patient here for a new oncology consult visit in cancer care? 2. No      Clinical concerns: none       Love Jennings CMA                Again, thank you for allowing me to participate in the care of your patient.        Sincerely,        JEREMIAH Medina CNP    Electronically signed"